# Patient Record
Sex: MALE | Race: WHITE | NOT HISPANIC OR LATINO
[De-identification: names, ages, dates, MRNs, and addresses within clinical notes are randomized per-mention and may not be internally consistent; named-entity substitution may affect disease eponyms.]

---

## 2017-01-17 ENCOUNTER — APPOINTMENT (OUTPATIENT)
Dept: INTERNAL MEDICINE | Facility: CLINIC | Age: 57
End: 2017-01-17

## 2017-04-15 ENCOUNTER — INPATIENT (INPATIENT)
Facility: HOSPITAL | Age: 57
LOS: 29 days | Discharge: HOME | End: 2017-05-15
Attending: HOSPITALIST

## 2017-04-15 DIAGNOSIS — I27.2 OTHER SECONDARY PULMONARY HYPERTENSION: ICD-10-CM

## 2017-06-27 DIAGNOSIS — N17.9 ACUTE KIDNEY FAILURE, UNSPECIFIED: ICD-10-CM

## 2017-06-27 DIAGNOSIS — L03.116 CELLULITIS OF LEFT LOWER LIMB: ICD-10-CM

## 2017-06-27 DIAGNOSIS — Z59.0 HOMELESSNESS: ICD-10-CM

## 2017-06-27 DIAGNOSIS — L97.922 NON-PRESSURE CHRONIC ULCER OF UNSPECIFIED PART OF LEFT LOWER LEG WITH FAT LAYER EXPOSED: ICD-10-CM

## 2017-06-27 DIAGNOSIS — I50.32 CHRONIC DIASTOLIC (CONGESTIVE) HEART FAILURE: ICD-10-CM

## 2017-06-27 DIAGNOSIS — N18.9 CHRONIC KIDNEY DISEASE, UNSPECIFIED: ICD-10-CM

## 2017-06-27 DIAGNOSIS — I27.2 OTHER SECONDARY PULMONARY HYPERTENSION: ICD-10-CM

## 2017-06-27 DIAGNOSIS — R06.02 SHORTNESS OF BREATH: ICD-10-CM

## 2017-06-27 DIAGNOSIS — J96.20 ACUTE AND CHRONIC RESPIRATORY FAILURE, UNSPECIFIED WHETHER WITH HYPOXIA OR HYPERCAPNIA: ICD-10-CM

## 2017-06-27 DIAGNOSIS — Z66 DO NOT RESUSCITATE: ICD-10-CM

## 2017-06-27 DIAGNOSIS — Z75.0 MEDICAL SERVICES NOT AVAILABLE IN HOME: ICD-10-CM

## 2017-06-27 DIAGNOSIS — I48.0 PAROXYSMAL ATRIAL FIBRILLATION: ICD-10-CM

## 2017-06-27 DIAGNOSIS — E66.2 MORBID (SEVERE) OBESITY WITH ALVEOLAR HYPOVENTILATION: ICD-10-CM

## 2017-06-27 DIAGNOSIS — Z91.128 PATIENT'S INTENTIONAL UNDERDOSING OF MEDICATION REGIMEN FOR OTHER REASON: ICD-10-CM

## 2017-06-27 DIAGNOSIS — E87.5 HYPERKALEMIA: ICD-10-CM

## 2017-06-27 DIAGNOSIS — K59.00 CONSTIPATION, UNSPECIFIED: ICD-10-CM

## 2017-06-27 DIAGNOSIS — I13.0 HYPERTENSIVE HEART AND CHRONIC KIDNEY DISEASE WITH HEART FAILURE AND STAGE 1 THROUGH STAGE 4 CHRONIC KIDNEY DISEASE, OR UNSPECIFIED CHRONIC KIDNEY DISEASE: ICD-10-CM

## 2017-06-27 DIAGNOSIS — T45.516A UNDERDOSING OF ANTICOAGULANTS, INITIAL ENCOUNTER: ICD-10-CM

## 2017-06-27 DIAGNOSIS — I71.4 ABDOMINAL AORTIC ANEURYSM, WITHOUT RUPTURE: ICD-10-CM

## 2017-06-27 DIAGNOSIS — I87.313 CHRONIC VENOUS HYPERTENSION (IDIOPATHIC) WITH ULCER OF BILATERAL LOWER EXTREMITY: ICD-10-CM

## 2017-06-27 DIAGNOSIS — I87.2 VENOUS INSUFFICIENCY (CHRONIC) (PERIPHERAL): ICD-10-CM

## 2017-06-27 DIAGNOSIS — T50.1X5A ADVERSE EFFECT OF LOOP [HIGH-CEILING] DIURETICS, INITIAL ENCOUNTER: ICD-10-CM

## 2017-06-27 DIAGNOSIS — E78.5 HYPERLIPIDEMIA, UNSPECIFIED: ICD-10-CM

## 2017-06-27 SDOH — ECONOMIC STABILITY - HOUSING INSECURITY: HOMELESSNESS: Z59.0

## 2017-06-28 ENCOUNTER — INPATIENT (INPATIENT)
Facility: HOSPITAL | Age: 57
LOS: 10 days | Discharge: HOME | End: 2017-07-09
Attending: INTERNAL MEDICINE

## 2017-06-28 DIAGNOSIS — I10 ESSENTIAL (PRIMARY) HYPERTENSION: ICD-10-CM

## 2017-06-28 DIAGNOSIS — J44.9 CHRONIC OBSTRUCTIVE PULMONARY DISEASE, UNSPECIFIED: ICD-10-CM

## 2017-06-28 DIAGNOSIS — I73.9 PERIPHERAL VASCULAR DISEASE, UNSPECIFIED: ICD-10-CM

## 2017-06-28 DIAGNOSIS — A41.9 SEPSIS, UNSPECIFIED ORGANISM: ICD-10-CM

## 2017-06-28 DIAGNOSIS — I27.2 OTHER SECONDARY PULMONARY HYPERTENSION: ICD-10-CM

## 2017-06-28 DIAGNOSIS — J81.0 ACUTE PULMONARY EDEMA: ICD-10-CM

## 2017-06-28 DIAGNOSIS — N18.3 CHRONIC KIDNEY DISEASE, STAGE 3 (MODERATE): ICD-10-CM

## 2017-06-28 DIAGNOSIS — I48.91 UNSPECIFIED ATRIAL FIBRILLATION: ICD-10-CM

## 2017-06-28 DIAGNOSIS — E87.5 HYPERKALEMIA: ICD-10-CM

## 2017-06-28 DIAGNOSIS — E66.01 MORBID (SEVERE) OBESITY DUE TO EXCESS CALORIES: ICD-10-CM

## 2017-06-28 DIAGNOSIS — L03.811 CELLULITIS OF HEAD [ANY PART, EXCEPT FACE]: ICD-10-CM

## 2017-06-28 DIAGNOSIS — L02.811 CUTANEOUS ABSCESS OF HEAD [ANY PART, EXCEPT FACE]: ICD-10-CM

## 2017-06-28 DIAGNOSIS — I50.9 HEART FAILURE, UNSPECIFIED: ICD-10-CM

## 2017-06-28 DIAGNOSIS — Z79.01 LONG TERM (CURRENT) USE OF ANTICOAGULANTS: ICD-10-CM

## 2017-06-28 DIAGNOSIS — E87.1 HYPO-OSMOLALITY AND HYPONATREMIA: ICD-10-CM

## 2017-06-28 DIAGNOSIS — I87.2 VENOUS INSUFFICIENCY (CHRONIC) (PERIPHERAL): ICD-10-CM

## 2017-06-28 DIAGNOSIS — L02.91 CUTANEOUS ABSCESS, UNSPECIFIED: ICD-10-CM

## 2017-06-28 DIAGNOSIS — N17.9 ACUTE KIDNEY FAILURE, UNSPECIFIED: ICD-10-CM

## 2017-06-28 DIAGNOSIS — I89.0 LYMPHEDEMA, NOT ELSEWHERE CLASSIFIED: ICD-10-CM

## 2017-06-28 DIAGNOSIS — H02.841 EDEMA OF RIGHT UPPER EYELID: ICD-10-CM

## 2017-06-28 DIAGNOSIS — G47.33 OBSTRUCTIVE SLEEP APNEA (ADULT) (PEDIATRIC): ICD-10-CM

## 2017-06-28 DIAGNOSIS — L03.90 CELLULITIS, UNSPECIFIED: ICD-10-CM

## 2017-06-28 DIAGNOSIS — H02.844 EDEMA OF LEFT UPPER EYELID: ICD-10-CM

## 2017-06-28 DIAGNOSIS — L03.213 PERIORBITAL CELLULITIS: ICD-10-CM

## 2017-06-28 DIAGNOSIS — I13.0 HYPERTENSIVE HEART AND CHRONIC KIDNEY DISEASE WITH HEART FAILURE AND STAGE 1 THROUGH STAGE 4 CHRONIC KIDNEY DISEASE, OR UNSPECIFIED CHRONIC KIDNEY DISEASE: ICD-10-CM

## 2017-12-11 ENCOUNTER — INPATIENT (INPATIENT)
Facility: HOSPITAL | Age: 57
LOS: 8 days | Discharge: HOME | End: 2017-12-20
Attending: INTERNAL MEDICINE

## 2017-12-11 DIAGNOSIS — G47.33 OBSTRUCTIVE SLEEP APNEA (ADULT) (PEDIATRIC): ICD-10-CM

## 2017-12-11 DIAGNOSIS — L02.91 CUTANEOUS ABSCESS, UNSPECIFIED: ICD-10-CM

## 2017-12-11 DIAGNOSIS — I50.9 HEART FAILURE, UNSPECIFIED: ICD-10-CM

## 2017-12-11 DIAGNOSIS — E66.01 MORBID (SEVERE) OBESITY DUE TO EXCESS CALORIES: ICD-10-CM

## 2017-12-11 DIAGNOSIS — J81.0 ACUTE PULMONARY EDEMA: ICD-10-CM

## 2017-12-11 DIAGNOSIS — L03.90 CELLULITIS, UNSPECIFIED: ICD-10-CM

## 2017-12-11 DIAGNOSIS — I10 ESSENTIAL (PRIMARY) HYPERTENSION: ICD-10-CM

## 2017-12-27 DIAGNOSIS — I50.33 ACUTE ON CHRONIC DIASTOLIC (CONGESTIVE) HEART FAILURE: ICD-10-CM

## 2017-12-27 DIAGNOSIS — E87.1 HYPO-OSMOLALITY AND HYPONATREMIA: ICD-10-CM

## 2017-12-27 DIAGNOSIS — L02.415 CUTANEOUS ABSCESS OF RIGHT LOWER LIMB: ICD-10-CM

## 2017-12-27 DIAGNOSIS — L03.115 CELLULITIS OF RIGHT LOWER LIMB: ICD-10-CM

## 2017-12-27 DIAGNOSIS — J44.1 CHRONIC OBSTRUCTIVE PULMONARY DISEASE WITH (ACUTE) EXACERBATION: ICD-10-CM

## 2017-12-27 DIAGNOSIS — I13.0 HYPERTENSIVE HEART AND CHRONIC KIDNEY DISEASE WITH HEART FAILURE AND STAGE 1 THROUGH STAGE 4 CHRONIC KIDNEY DISEASE, OR UNSPECIFIED CHRONIC KIDNEY DISEASE: ICD-10-CM

## 2017-12-27 DIAGNOSIS — E66.01 MORBID (SEVERE) OBESITY DUE TO EXCESS CALORIES: ICD-10-CM

## 2017-12-27 DIAGNOSIS — L03.90 CELLULITIS, UNSPECIFIED: ICD-10-CM

## 2017-12-27 DIAGNOSIS — I27.20 PULMONARY HYPERTENSION, UNSPECIFIED: ICD-10-CM

## 2017-12-27 DIAGNOSIS — N18.3 CHRONIC KIDNEY DISEASE, STAGE 3 (MODERATE): ICD-10-CM

## 2017-12-27 DIAGNOSIS — E11.51 TYPE 2 DIABETES MELLITUS WITH DIABETIC PERIPHERAL ANGIOPATHY WITHOUT GANGRENE: ICD-10-CM

## 2017-12-27 DIAGNOSIS — N17.9 ACUTE KIDNEY FAILURE, UNSPECIFIED: ICD-10-CM

## 2017-12-27 DIAGNOSIS — I50.9 HEART FAILURE, UNSPECIFIED: ICD-10-CM

## 2017-12-27 DIAGNOSIS — G47.33 OBSTRUCTIVE SLEEP APNEA (ADULT) (PEDIATRIC): ICD-10-CM

## 2017-12-27 DIAGNOSIS — E11.22 TYPE 2 DIABETES MELLITUS WITH DIABETIC CHRONIC KIDNEY DISEASE: ICD-10-CM

## 2017-12-27 DIAGNOSIS — A41.9 SEPSIS, UNSPECIFIED ORGANISM: ICD-10-CM

## 2017-12-27 DIAGNOSIS — Z66 DO NOT RESUSCITATE: ICD-10-CM

## 2017-12-27 DIAGNOSIS — Z53.29 PROCEDURE AND TREATMENT NOT CARRIED OUT BECAUSE OF PATIENT'S DECISION FOR OTHER REASONS: ICD-10-CM

## 2017-12-27 DIAGNOSIS — I83.019 VARICOSE VEINS OF RIGHT LOWER EXTREMITY WITH ULCER OF UNSPECIFIED SITE: ICD-10-CM

## 2017-12-27 DIAGNOSIS — I48.91 UNSPECIFIED ATRIAL FIBRILLATION: ICD-10-CM

## 2017-12-27 DIAGNOSIS — E87.5 HYPERKALEMIA: ICD-10-CM

## 2017-12-27 DIAGNOSIS — L97.919 NON-PRESSURE CHRONIC ULCER OF UNSPECIFIED PART OF RIGHT LOWER LEG WITH UNSPECIFIED SEVERITY: ICD-10-CM

## 2017-12-27 DIAGNOSIS — Z79.01 LONG TERM (CURRENT) USE OF ANTICOAGULANTS: ICD-10-CM

## 2018-06-19 ENCOUNTER — LABORATORY RESULT (OUTPATIENT)
Age: 58
End: 2018-06-19

## 2018-06-20 ENCOUNTER — NON-APPOINTMENT (OUTPATIENT)
Age: 58
End: 2018-06-20

## 2018-06-20 ENCOUNTER — APPOINTMENT (OUTPATIENT)
Dept: CARDIOLOGY | Facility: CLINIC | Age: 58
End: 2018-06-20

## 2018-06-20 VITALS
HEART RATE: 99 BPM | DIASTOLIC BLOOD PRESSURE: 100 MMHG | HEIGHT: 66 IN | BODY MASS INDEX: 50.62 KG/M2 | SYSTOLIC BLOOD PRESSURE: 180 MMHG | WEIGHT: 315 LBS

## 2018-06-20 DIAGNOSIS — I48.0 PAROXYSMAL ATRIAL FIBRILLATION: ICD-10-CM

## 2018-06-20 DIAGNOSIS — I10 ESSENTIAL (PRIMARY) HYPERTENSION: ICD-10-CM

## 2018-06-20 DIAGNOSIS — I87.2 VENOUS INSUFFICIENCY (CHRONIC) (PERIPHERAL): ICD-10-CM

## 2018-06-20 DIAGNOSIS — E78.2 MIXED HYPERLIPIDEMIA: ICD-10-CM

## 2018-06-20 DIAGNOSIS — I71.2 THORACIC AORTIC ANEURYSM, W/OUT RUPTURE: ICD-10-CM

## 2018-06-20 DIAGNOSIS — Z87.891 PERSONAL HISTORY OF NICOTINE DEPENDENCE: ICD-10-CM

## 2018-06-20 RX ORDER — ATORVASTATIN CALCIUM 20 MG/1
20 TABLET, FILM COATED ORAL
Qty: 90 | Refills: 3 | Status: ACTIVE | COMMUNITY

## 2018-06-20 RX ORDER — LISINOPRIL 10 MG/1
10 TABLET ORAL DAILY
Qty: 90 | Refills: 3 | Status: ACTIVE | COMMUNITY

## 2018-06-20 RX ORDER — DILTIAZEM HYDROCHLORIDE 300 MG/1
300 CAPSULE, COATED, EXTENDED RELEASE ORAL DAILY
Refills: 0 | Status: ACTIVE | COMMUNITY

## 2018-06-20 RX ORDER — FUROSEMIDE 40 MG/1
40 TABLET ORAL DAILY
Qty: 90 | Refills: 3 | Status: ACTIVE | COMMUNITY
Start: 1900-01-01 | End: 1900-01-01

## 2018-06-20 RX ORDER — ZOLPIDEM TARTRATE 10 MG/1
10 TABLET, FILM COATED ORAL
Refills: 0 | Status: ACTIVE | COMMUNITY

## 2018-06-20 RX ORDER — RIVAROXABAN 20 MG/1
20 TABLET, FILM COATED ORAL
Qty: 60 | Refills: 2 | Status: ACTIVE | COMMUNITY

## 2018-06-20 RX ORDER — METOPROLOL SUCCINATE 50 MG/1
50 TABLET, EXTENDED RELEASE ORAL DAILY
Qty: 90 | Refills: 3 | Status: ACTIVE | COMMUNITY
Start: 2018-06-20 | End: 1900-01-01

## 2018-06-25 LAB
ALBUMIN SERPL ELPH-MCNC: 4 G/DL
ALP BLD-CCNC: 82 U/L
ALT SERPL-CCNC: 13 U/L
ANION GAP SERPL CALC-SCNC: 17 MMOL/L
AST SERPL-CCNC: 16 U/L
BASOPHILS # BLD AUTO: 0.07 K/UL
BASOPHILS NFR BLD AUTO: 0.7 %
BILIRUB SERPL-MCNC: 0.4 MG/DL
BUN SERPL-MCNC: 25 MG/DL
CALCIUM SERPL-MCNC: 9.2 MG/DL
CHLORIDE SERPL-SCNC: 95 MMOL/L
CO2 SERPL-SCNC: 29 MMOL/L
CREAT SERPL-MCNC: 1.4 MG/DL
EOSINOPHIL # BLD AUTO: 0.26 K/UL
EOSINOPHIL NFR BLD AUTO: 2.7 %
GLUCOSE SERPL-MCNC: 177 MG/DL
HCT VFR BLD CALC: 38.7 %
HGB BLD-MCNC: 12.2 G/DL
IMM GRANULOCYTES NFR BLD AUTO: 0.4 %
LYMPHOCYTES # BLD AUTO: 1.51 K/UL
LYMPHOCYTES NFR BLD AUTO: 15.6 %
MAN DIFF?: NORMAL
MCHC RBC-ENTMCNC: 28.8 PG
MCHC RBC-ENTMCNC: 31.5 G/DL
MCV RBC AUTO: 91.3 FL
MONOCYTES # BLD AUTO: 0.94 K/UL
MONOCYTES NFR BLD AUTO: 9.7 %
NEUTROPHILS # BLD AUTO: 6.83 K/UL
NEUTROPHILS NFR BLD AUTO: 70.9 %
PLATELET # BLD AUTO: 213 K/UL
POTASSIUM SERPL-SCNC: 4.7 MMOL/L
PROT SERPL-MCNC: 7.9 G/DL
RBC # BLD: 4.24 M/UL
RBC # FLD: 15.9 %
SODIUM SERPL-SCNC: 141 MMOL/L
WBC # FLD AUTO: 9.65 K/UL

## 2018-07-10 ENCOUNTER — OUTPATIENT (OUTPATIENT)
Dept: OUTPATIENT SERVICES | Facility: HOSPITAL | Age: 58
LOS: 1 days | Discharge: HOME | End: 2018-07-10

## 2018-07-10 DIAGNOSIS — I71.2 THORACIC AORTIC ANEURYSM, WITHOUT RUPTURE: ICD-10-CM

## 2019-03-03 ENCOUNTER — EMERGENCY (EMERGENCY)
Facility: HOSPITAL | Age: 59
LOS: 0 days | Discharge: HOME | End: 2019-03-03
Attending: STUDENT IN AN ORGANIZED HEALTH CARE EDUCATION/TRAINING PROGRAM | Admitting: STUDENT IN AN ORGANIZED HEALTH CARE EDUCATION/TRAINING PROGRAM

## 2019-03-03 VITALS
OXYGEN SATURATION: 95 % | HEART RATE: 68 BPM | DIASTOLIC BLOOD PRESSURE: 83 MMHG | TEMPERATURE: 96 F | RESPIRATION RATE: 18 BRPM | SYSTOLIC BLOOD PRESSURE: 150 MMHG

## 2019-03-03 DIAGNOSIS — E78.00 PURE HYPERCHOLESTEROLEMIA, UNSPECIFIED: ICD-10-CM

## 2019-03-03 DIAGNOSIS — I11.0 HYPERTENSIVE HEART DISEASE WITH HEART FAILURE: ICD-10-CM

## 2019-03-03 DIAGNOSIS — R06.02 SHORTNESS OF BREATH: ICD-10-CM

## 2019-03-03 DIAGNOSIS — Z77.120 CONTACT WITH AND (SUSPECTED) EXPOSURE TO MOLD (TOXIC): ICD-10-CM

## 2019-03-03 DIAGNOSIS — J44.9 CHRONIC OBSTRUCTIVE PULMONARY DISEASE, UNSPECIFIED: ICD-10-CM

## 2019-03-03 DIAGNOSIS — I50.9 HEART FAILURE, UNSPECIFIED: ICD-10-CM

## 2019-03-03 DIAGNOSIS — I48.91 UNSPECIFIED ATRIAL FIBRILLATION: ICD-10-CM

## 2019-03-03 DIAGNOSIS — R60.0 LOCALIZED EDEMA: ICD-10-CM

## 2019-03-03 DIAGNOSIS — Z79.01 LONG TERM (CURRENT) USE OF ANTICOAGULANTS: ICD-10-CM

## 2019-03-03 LAB
ALBUMIN SERPL ELPH-MCNC: 4.1 G/DL — SIGNIFICANT CHANGE UP (ref 3.5–5.2)
ALP SERPL-CCNC: 103 U/L — SIGNIFICANT CHANGE UP (ref 30–115)
ALT FLD-CCNC: 19 U/L — SIGNIFICANT CHANGE UP (ref 0–41)
ANION GAP SERPL CALC-SCNC: 13 MMOL/L — SIGNIFICANT CHANGE UP (ref 7–14)
AST SERPL-CCNC: 24 U/L — SIGNIFICANT CHANGE UP (ref 0–41)
BASOPHILS # BLD AUTO: 0.03 K/UL — SIGNIFICANT CHANGE UP (ref 0–0.2)
BASOPHILS NFR BLD AUTO: 0.4 % — SIGNIFICANT CHANGE UP (ref 0–1)
BILIRUB SERPL-MCNC: 0.4 MG/DL — SIGNIFICANT CHANGE UP (ref 0.2–1.2)
BUN SERPL-MCNC: 22 MG/DL — HIGH (ref 10–20)
CALCIUM SERPL-MCNC: 9 MG/DL — SIGNIFICANT CHANGE UP (ref 8.5–10.1)
CHLORIDE SERPL-SCNC: 99 MMOL/L — SIGNIFICANT CHANGE UP (ref 98–110)
CO2 SERPL-SCNC: 25 MMOL/L — SIGNIFICANT CHANGE UP (ref 17–32)
CREAT SERPL-MCNC: 1.3 MG/DL — SIGNIFICANT CHANGE UP (ref 0.7–1.5)
EOSINOPHIL # BLD AUTO: 0.16 K/UL — SIGNIFICANT CHANGE UP (ref 0–0.7)
EOSINOPHIL NFR BLD AUTO: 2 % — SIGNIFICANT CHANGE UP (ref 0–8)
GLUCOSE SERPL-MCNC: 94 MG/DL — SIGNIFICANT CHANGE UP (ref 70–99)
HCT VFR BLD CALC: 38.4 % — LOW (ref 42–52)
HGB BLD-MCNC: 12.4 G/DL — LOW (ref 14–18)
IMM GRANULOCYTES NFR BLD AUTO: 0.4 % — HIGH (ref 0.1–0.3)
LACTATE SERPL-SCNC: 1.4 MMOL/L — SIGNIFICANT CHANGE UP (ref 0.5–2.2)
LYMPHOCYTES # BLD AUTO: 1.19 K/UL — LOW (ref 1.2–3.4)
LYMPHOCYTES # BLD AUTO: 14.8 % — LOW (ref 20.5–51.1)
MCHC RBC-ENTMCNC: 29.3 PG — SIGNIFICANT CHANGE UP (ref 27–31)
MCHC RBC-ENTMCNC: 32.3 G/DL — SIGNIFICANT CHANGE UP (ref 32–37)
MCV RBC AUTO: 90.8 FL — SIGNIFICANT CHANGE UP (ref 80–94)
MONOCYTES # BLD AUTO: 0.76 K/UL — HIGH (ref 0.1–0.6)
MONOCYTES NFR BLD AUTO: 9.5 % — HIGH (ref 1.7–9.3)
NEUTROPHILS # BLD AUTO: 5.87 K/UL — SIGNIFICANT CHANGE UP (ref 1.4–6.5)
NEUTROPHILS NFR BLD AUTO: 72.9 % — SIGNIFICANT CHANGE UP (ref 42.2–75.2)
NRBC # BLD: 0 /100 WBCS — SIGNIFICANT CHANGE UP (ref 0–0)
NT-PROBNP SERPL-SCNC: 94 PG/ML — SIGNIFICANT CHANGE UP (ref 0–300)
PLATELET # BLD AUTO: 208 K/UL — SIGNIFICANT CHANGE UP (ref 130–400)
POTASSIUM SERPL-MCNC: 4.3 MMOL/L — SIGNIFICANT CHANGE UP (ref 3.5–5)
POTASSIUM SERPL-SCNC: 4.3 MMOL/L — SIGNIFICANT CHANGE UP (ref 3.5–5)
PROT SERPL-MCNC: 7.1 G/DL — SIGNIFICANT CHANGE UP (ref 6–8)
RBC # BLD: 4.23 M/UL — LOW (ref 4.7–6.1)
RBC # FLD: 14.4 % — SIGNIFICANT CHANGE UP (ref 11.5–14.5)
SODIUM SERPL-SCNC: 137 MMOL/L — SIGNIFICANT CHANGE UP (ref 135–146)
TROPONIN T SERPL-MCNC: <0.01 NG/ML — SIGNIFICANT CHANGE UP
WBC # BLD: 8.04 K/UL — SIGNIFICANT CHANGE UP (ref 4.8–10.8)
WBC # FLD AUTO: 8.04 K/UL — SIGNIFICANT CHANGE UP (ref 4.8–10.8)

## 2019-03-03 RX ORDER — IPRATROPIUM/ALBUTEROL SULFATE 18-103MCG
3 AEROSOL WITH ADAPTER (GRAM) INHALATION ONCE
Qty: 0 | Refills: 0 | Status: COMPLETED | OUTPATIENT
Start: 2019-03-03 | End: 2019-03-03

## 2019-03-03 RX ADMIN — Medication 3 MILLILITER(S): at 15:12

## 2019-03-03 NOTE — ED ADULT TRIAGE NOTE - CHIEF COMPLAINT QUOTE
patient states he thinks he was exposed to black mold yesterday. states he has sob and dizziness. patient also c/o right leg pain from tripping a few days ago.

## 2019-03-03 NOTE — ED PROVIDER NOTE - CARE PROVIDER_API CALL
Bonnie Canela)  [facility]  [address] [place] [address]  Phone: [phone]  Fax: [phone]  Follow Up Time: 4-6 Days

## 2019-03-03 NOTE — ED ADULT NURSE NOTE - OBJECTIVE STATEMENT
pt c.o. right leg pain after tripping, denies fall. pt reports sob on exertion after being exposed to mold in apartment. denies chest pain

## 2019-03-03 NOTE — ED PROVIDER NOTE - NSFOLLOWUPINSTRUCTIONS_ED_ALL_ED_FT
How Your Lungs Work    AMBULATORY CARE:    Your lungs are part of the respiratory system. The respiratory system contains organs and tissues that help you breathe. When you breathe in (inhale), your lungs remove oxygen from the air. The oxygen is moved into your blood and goes into your heart. Your heart then pumps the oxygen to the rest of your body. When you breathe out (exhale), your lungs remove waste gas (carbon dioxide) from your body.Inspiration and Expiration         Parts of the lung:     Bronchial tubes branch from your windpipe into your left and right lungs. Bronchial tubes branch into smaller tubes (bronchioles) and end as alveoli.      Alveoli are small air sacs that have capillaries within their walls. Capillaries are small blood vessels where the exchange of oxygen and carbon dioxide happen.       Lobes are the sections of your lungs. Your right lung has 3 lobes and your left lung has 2 lobes. Your left lung is smaller to make room for your heart.      Pleura is the membrane that covers your lungs and keeps them from touching your chest wall.      Cilia are very small hair-like tissues that line the bronchial tubes. They wave back and forth and carry mucus up out of your lungs into your throat. Once in your throat, the mucus can be coughed out or swallowed.    Other organs and tissues used in breathing:     Air comes into your body through your nose or mouth and travels down your windpipe.      The muscles between your ribs allow your ribs to expand and contract slightly. This gives your lungs room to fill with air and deflate.       Your diaphragm is a muscle that separates your chest and abdominal cavity. Your diaphragm is just below your lungs. As your diaphragm moves down, your lungs expand.       Abdominal muscles help you breathe out when you are breathing fast. During exercise, your abdominal muscles push your diaphragm against your lungs more often. This pushes air out of your lungs faster and more often.       Neck and shoulder muscles may help expand the lungs if you have a lung condition, such as emphysema. Other muscles have to help in emphysema because the diaphragm does not work properly.     Decrease your risk for lung problems:     Do not smoke. Smoking can make your airways narrow. Narrow airways can make breathing difficult. Smoking can cause long-term swelling of your lungs and destroy lung tissue. Smoking also increases your risk for cancer. If you smoke, it is never too late to quit. Ask for information if you need help quitting. E-cigarettes or smokeless tobacco still contain nicotine. Talk to your healthcare provider before you use these products.      Avoid risks of toxins in the air. Toxins include secondhand smoke, air pollution, chemicals, and radon. Toxins can cause lung disease or make your lung disease worse. Test your house for radon. Make your house and car smoke-free areas. Do not exercise outside on bad air days.       Prevent infection. Respiratory infections and colds may become serious for a person who has a lung condition. Wash your hands often with soap and water. Avoid crowds during cold and flu seasons. Get a yearly flu vaccine. Ask your healthcare provider if you need a pneumonia vaccine.      Follow up with your healthcare provider regularly. Your healthcare provider will listen to your lungs. Regular follow-up visits can help your healthcare provider find a lung disease before it becomes serious.      Exercising to Lose Weight  ImageExercising can help you to lose weight. In order to lose weight through exercise, you need to do vigorous-intensity exercise. You can tell that you are exercising with vigorous intensity if you are breathing very hard and fast and cannot hold a conversation while exercising.    Moderate-intensity exercise helps to maintain your current weight. You can tell that you are exercising at a moderate level if you have a higher heart rate and faster breathing, but you are still able to hold a conversation.    How often should I exercise?  Choose an activity that you enjoy and set realistic goals. Your health care provider can help you to make an activity plan that works for you. Exercise regularly as directed by your health care provider. This may include:    Doing resistance training twice each week, such as:    Push-ups.  Sit-ups.  Lifting weights.  Using resistance bands.    Doing a given intensity of exercise for a given amount of time. Choose from these options:    150 minutes of moderate-intensity exercise every week.  75 minutes of vigorous-intensity exercise every week.  A mix of moderate-intensity and vigorous-intensity exercise every week.      Children, pregnant women, people who are out of shape, people who are overweight, and older adults may need to consult a health care provider for individual recommendations. If you have any sort of medical condition, be sure to consult your health care provider before starting a new exercise program.    What are some activities that can help me to lose weight?  Walking at a rate of at least 4.5 miles an hour.  Jogging or running at a rate of 5 miles per hour.  Biking at a rate of at least 10 miles per hour.  Lap swimming.  Roller-skating or in-line skating.  Cross-country skiing.  Vigorous competitive sports, such as football, basketball, and soccer.  Jumping rope.  Aerobic dancing.  How can I be more active in my day-to-day activities?  Use the stairs instead of the elevator.  Take a walk during your lunch break.  If you drive, park your car farther away from work or school.  If you take public transportation, get off one stop early and walk the rest of the way.  Make all of your phone calls while standing up and walking around.  Get up, stretch, and walk around every 30 minutes throughout the day.  What guidelines should I follow while exercising?  Do not exercise so much that you hurt yourself, feel dizzy, or get very short of breath.  Consult your health care provider prior to starting a new exercise program.  Wear comfortable clothes and shoes with good support.  Drink plenty of water while you exercise to prevent dehydration or heat stroke. Body water is lost during exercise and must be replaced.  Work out until you breathe faster and your heart beats faster.  This information is not intended to replace advice given to you by your health care provider. Make sure you discuss any questions you have with your health care provider.

## 2019-03-03 NOTE — ED PROVIDER NOTE - CLINICAL SUMMARY MEDICAL DECISION MAKING FREE TEXT BOX
Pt p/w improving SOB and leg pain after fall. w/up neg for acute serious problem requiring further emergent intervention. abx sent to pt's pharmacy w/ wait and see strategy for possible le cellulits (As per pt request.).  Pt stable for dc w/ PMD f/up, and care as discussed.  Pt/ family understands plan and signs and symptoms for ED return.

## 2019-03-03 NOTE — ED PROVIDER NOTE - PHYSICAL EXAMINATION
CONSTITUTIONAL: NAD  SKIN: Warm dry  HEAD: NCAT  EYES: NL inspection  ENT: MMM  NECK: Supple; non tender.  CARD: RRR  RESP: CTAB  ABD: S/NT no R/G  EXT: + b/l pedal edema at baseline, R > L at baseline. + thickened, shiny skin b/l distal tib; + ttp R ant distal tib; pulses 2+ b/l  NEURO: Grossly unremarkable  PSYCH: Cooperative, appropriate.

## 2019-03-03 NOTE — ED PROVIDER NOTE - OBJECTIVE STATEMENT
60 y/o M pmh afib on eliquis, CHF, COPD on home O2 prn, ALEXANDRA, HTN Hlipid, b/l lymphedema, p/w concern for SOB. Pt had moved in [facility] last week, had spent 5 full days in house, became progressively short of breath, felt some airway irritation, then note black mold in throughout house.  Pt has been away from house now for several days and sx have improved.  No fever, cp, new leg swelling.  Pt also notes pain to R anterior distal tib constant x3wks.  No erythema, sara calf or polpiteal pain or hx vte.  NO sig travel or immobilization. 60 y/o M pmh afib on eliquis, CHF, COPD on home O2 prn, ALEXANDRA, HTN Hlipid, b/l lymphedema, p/w concern for SOB. Pt had moved in [facility] last week, had spent 5 full days in house, became progressively short of breath, felt some airway irritation, then note black mold in throughout house.  Pt has been away from house now for several days and sx have improved.  No fever, cp, new leg swelling.  Pt also notes pain to R anterior distal tib constant x3wks s/p several falls on ice. ambulatory. no weakness or numbness.  No erythema, sara calf or polpiteal pain or hx vte.  NO sig travel or immobilization.

## 2019-03-03 NOTE — ED PROVIDER NOTE - ATTENDING CONTRIBUTION TO CARE
Agree w/ above.  IMP: pulmonary irritant vs allergy vs copd? sx improving; + leg pain, does not look like DVT, nor cellulitis.   P: labs, cxr, xray tib, neb, ekg, reassess.

## 2019-03-03 NOTE — ED PROVIDER NOTE - NSPTACCESSSVCSAPPTDETAILS_ED_ALL_ED_FT
Please follow up with your primary care doctor within 1-2 days.  Please follow up with a bariatric surgeon.

## 2019-03-03 NOTE — ED PROVIDER NOTE - PMH
COPD (chronic obstructive pulmonary disease)    High cholesterol    HTN (hypertension)    Thoracic aortic aneurysm without rupture

## 2019-03-03 NOTE — ED ADULT NURSE NOTE - PMH
COPD (chronic obstructive pulmonary disease) COPD (chronic obstructive pulmonary disease)    High cholesterol    HTN (hypertension)    Thoracic aortic aneurysm without rupture

## 2019-04-02 ENCOUNTER — INPATIENT (INPATIENT)
Facility: HOSPITAL | Age: 59
LOS: 3 days | Discharge: HOME | End: 2019-04-06
Attending: INTERNAL MEDICINE | Admitting: INTERNAL MEDICINE
Payer: MEDICAID

## 2019-04-02 VITALS
RESPIRATION RATE: 18 BRPM | DIASTOLIC BLOOD PRESSURE: 77 MMHG | TEMPERATURE: 98 F | OXYGEN SATURATION: 98 % | HEART RATE: 77 BPM | SYSTOLIC BLOOD PRESSURE: 161 MMHG

## 2019-04-02 LAB
ALBUMIN SERPL ELPH-MCNC: 4.2 G/DL — SIGNIFICANT CHANGE UP (ref 3.5–5.2)
ALP SERPL-CCNC: 92 U/L — SIGNIFICANT CHANGE UP (ref 30–115)
ALT FLD-CCNC: 19 U/L — SIGNIFICANT CHANGE UP (ref 0–41)
ANION GAP SERPL CALC-SCNC: 13 MMOL/L — SIGNIFICANT CHANGE UP (ref 7–14)
APTT BLD: 38.1 SEC — SIGNIFICANT CHANGE UP (ref 27–39.2)
AST SERPL-CCNC: 19 U/L — SIGNIFICANT CHANGE UP (ref 0–41)
BASE EXCESS BLDV CALC-SCNC: 4.6 MMOL/L — HIGH (ref -2–2)
BASOPHILS # BLD AUTO: 0.04 K/UL — SIGNIFICANT CHANGE UP (ref 0–0.2)
BASOPHILS NFR BLD AUTO: 0.5 % — SIGNIFICANT CHANGE UP (ref 0–1)
BILIRUB SERPL-MCNC: 0.5 MG/DL — SIGNIFICANT CHANGE UP (ref 0.2–1.2)
BUN SERPL-MCNC: 23 MG/DL — HIGH (ref 10–20)
CA-I SERPL-SCNC: 1.18 MMOL/L — SIGNIFICANT CHANGE UP (ref 1.12–1.3)
CALCIUM SERPL-MCNC: 9.2 MG/DL — SIGNIFICANT CHANGE UP (ref 8.5–10.1)
CHLORIDE SERPL-SCNC: 100 MMOL/L — SIGNIFICANT CHANGE UP (ref 98–110)
CO2 SERPL-SCNC: 26 MMOL/L — SIGNIFICANT CHANGE UP (ref 17–32)
CREAT SERPL-MCNC: 1.1 MG/DL — SIGNIFICANT CHANGE UP (ref 0.7–1.5)
EOSINOPHIL # BLD AUTO: 0.24 K/UL — SIGNIFICANT CHANGE UP (ref 0–0.7)
EOSINOPHIL NFR BLD AUTO: 2.8 % — SIGNIFICANT CHANGE UP (ref 0–8)
GAS PNL BLDV: 138 MMOL/L — SIGNIFICANT CHANGE UP (ref 136–145)
GAS PNL BLDV: SIGNIFICANT CHANGE UP
GLUCOSE SERPL-MCNC: 90 MG/DL — SIGNIFICANT CHANGE UP (ref 70–99)
HCO3 BLDV-SCNC: 30 MMOL/L — HIGH (ref 22–29)
HCT VFR BLD CALC: 39.3 % — LOW (ref 42–52)
HCT VFR BLDA CALC: 41 % — SIGNIFICANT CHANGE UP (ref 34–44)
HGB BLD CALC-MCNC: 13.4 G/DL — LOW (ref 14–18)
HGB BLD-MCNC: 13 G/DL — LOW (ref 14–18)
IMM GRANULOCYTES NFR BLD AUTO: 0.4 % — HIGH (ref 0.1–0.3)
INR BLD: 1.49 RATIO — HIGH (ref 0.65–1.3)
LACTATE BLDV-MCNC: 0.8 MMOL/L — SIGNIFICANT CHANGE UP (ref 0.5–1.6)
LYMPHOCYTES # BLD AUTO: 1.49 K/UL — SIGNIFICANT CHANGE UP (ref 1.2–3.4)
LYMPHOCYTES # BLD AUTO: 17.5 % — LOW (ref 20.5–51.1)
MCHC RBC-ENTMCNC: 29.2 PG — SIGNIFICANT CHANGE UP (ref 27–31)
MCHC RBC-ENTMCNC: 33.1 G/DL — SIGNIFICANT CHANGE UP (ref 32–37)
MCV RBC AUTO: 88.3 FL — SIGNIFICANT CHANGE UP (ref 80–94)
MONOCYTES # BLD AUTO: 0.93 K/UL — HIGH (ref 0.1–0.6)
MONOCYTES NFR BLD AUTO: 10.9 % — HIGH (ref 1.7–9.3)
NEUTROPHILS # BLD AUTO: 5.77 K/UL — SIGNIFICANT CHANGE UP (ref 1.4–6.5)
NEUTROPHILS NFR BLD AUTO: 67.9 % — SIGNIFICANT CHANGE UP (ref 42.2–75.2)
NRBC # BLD: 0 /100 WBCS — SIGNIFICANT CHANGE UP (ref 0–0)
NT-PROBNP SERPL-SCNC: 339 PG/ML — HIGH (ref 0–300)
PCO2 BLDV: 48 MMHG — SIGNIFICANT CHANGE UP (ref 41–51)
PH BLDV: 7.41 — SIGNIFICANT CHANGE UP (ref 7.26–7.43)
PLATELET # BLD AUTO: 184 K/UL — SIGNIFICANT CHANGE UP (ref 130–400)
PO2 BLDV: 34 MMHG — SIGNIFICANT CHANGE UP (ref 20–40)
POTASSIUM BLDV-SCNC: 4.1 MMOL/L — SIGNIFICANT CHANGE UP (ref 3.3–5.6)
POTASSIUM SERPL-MCNC: 4.4 MMOL/L — SIGNIFICANT CHANGE UP (ref 3.5–5)
POTASSIUM SERPL-SCNC: 4.4 MMOL/L — SIGNIFICANT CHANGE UP (ref 3.5–5)
PROT SERPL-MCNC: 7.1 G/DL — SIGNIFICANT CHANGE UP (ref 6–8)
PROTHROM AB SERPL-ACNC: 17.1 SEC — HIGH (ref 9.95–12.87)
RBC # BLD: 4.45 M/UL — LOW (ref 4.7–6.1)
RBC # FLD: 13.9 % — SIGNIFICANT CHANGE UP (ref 11.5–14.5)
SAO2 % BLDV: 64 % — SIGNIFICANT CHANGE UP
SODIUM SERPL-SCNC: 139 MMOL/L — SIGNIFICANT CHANGE UP (ref 135–146)
TROPONIN T SERPL-MCNC: <0.01 NG/ML — SIGNIFICANT CHANGE UP
WBC # BLD: 8.5 K/UL — SIGNIFICANT CHANGE UP (ref 4.8–10.8)
WBC # FLD AUTO: 8.5 K/UL — SIGNIFICANT CHANGE UP (ref 4.8–10.8)

## 2019-04-02 PROCEDURE — 70450 CT HEAD/BRAIN W/O DYE: CPT | Mod: 26

## 2019-04-02 PROCEDURE — 93010 ELECTROCARDIOGRAM REPORT: CPT

## 2019-04-02 PROCEDURE — 71045 X-RAY EXAM CHEST 1 VIEW: CPT | Mod: 26

## 2019-04-02 PROCEDURE — 93970 EXTREMITY STUDY: CPT | Mod: 26

## 2019-04-02 PROCEDURE — 99285 EMERGENCY DEPT VISIT HI MDM: CPT

## 2019-04-02 NOTE — ED ADULT NURSE NOTE - OBJECTIVE STATEMENT
pt 58y/o male with pmh of chf, copd, htn, dm, afib presents to the ed with increase SOB for a few days. pt states its worse with activity. pt denies of chest pain, palpitations, dizziness, cough, or fever and chills.

## 2019-04-02 NOTE — ED PROVIDER NOTE - NS ED ROS FT
Review of Systems:  •	CONSTITUTIONAL - No fever, + diaphoresis, +chills, No weight change  •	SKIN - No rash  •	HEMATOLOGIC - No abnormal bleeding or bruising  •	EYES - No eye pain, No blurred vision  •	ENT - No change in hearing, No sore throat, No neck pain, No rhinorrhea, No ear pain  •	RESPIRATORY - + shortness of breath, + productive cough  •	CARDIAC -+ chest tightness, No palpitations  •	GI - No abdominal pain, No nausea, No vomiting, No diarrhea, No constipation, No bright red blood per rectum or melena. No flank pain  •                 - No dysuria, frequency, hematuria.   •	ENDO - No polydypsia, No polyuria, No heat/cold intolerance  •	MUSCULOSKELETAL - No joint paint, + swelling LE (acute on chronic), No back pain  •	NEUROLOGIC - No numbness, No focal weakness, No headache, No dizziness  All other systems negative, unless specified in HPI

## 2019-04-02 NOTE — ED ADULT NURSE NOTE - PMH
Afib    CHF (congestive heart failure)    COPD (chronic obstructive pulmonary disease)    Diabetes mellitus, type 2    High cholesterol    HTN (hypertension)    Obesity    Sleep apnea    Thoracic aortic aneurysm without rupture

## 2019-04-02 NOTE — ED PROVIDER NOTE - OBJECTIVE STATEMENT
59 y o M w pmh of Afib on Xarelto, CHF, COPD (not currently on O2), ALEXANDRA, HTN, HLD, b/l Lymphedema, DM, presents w/ one week history of worsening shortness of breath, dizziness, sweats, chills, productive cough, and chest pain, described as a tightness, constant, and non radiating, in context of 1 month of broken CPAP (which insurance told patient would take 4-6 more weeks to send out).  Patient reports dizziness yesterday AM was severe enough where he lost his balance while walking, with consequent head trauma.  Also reports worsening b/l leg swelling over the last few days w/ R > L.  Denies fever, abdominal pain, n/v/d/c, confusion, hemoptysis, recent surgery. 59 y o M w pmh of Afib on Xarelto, CHF, COPD (not currently on O2), ALEXANDRA, HTN, HLD, b/l Lymphedema, DM, presents w/ one week history of worsening shortness of breath, dizziness, sweats, chills, productive cough, and chest pain, described as a tightness, constant, and non radiating, in context of 1 month of broken CPAP (which insurance told patient would take 4-6 more weeks to send out).  Patient reports dizziness yesterday AM was severe enough where he lost his balance while walking, with consequent fall & head trauma.  Also reports worsening b/l leg swelling over the last few days w/ R > L.  Denies fever, abdominal pain, n/v/d/c, confusion, hemoptysis, recent surgery.

## 2019-04-02 NOTE — ED ADULT NURSE NOTE - NSIMPLEMENTINTERV_GEN_ALL_ED
Implemented All Universal Safety Interventions:  [place] to call system. Call bell, personal items and telephone within reach. Instruct patient to call for assistance. Room bathroom lighting operational. Non-slip footwear when patient is off stretcher. Physically safe environment: no spills, clutter or unnecessary equipment. Stretcher in lowest position, wheels locked, appropriate side rails in place.

## 2019-04-02 NOTE — ED PROVIDER NOTE - CARE PROVIDER_API CALL
Judie Esteves)  Internal Medicine  [address] [place] [address]  Phone: [phone]  Fax: [phone]  Follow Up Time:

## 2019-04-02 NOTE — ED PROVIDER NOTE - ATTENDING CONTRIBUTION TO CARE
I personally evaluated the patient. I reviewed the Resident’s or Physician Assistant’s note (as assigned above), and agree with the findings and plan except as documented in my note.  58 yo man with Afib on rivaroxaban, lymphedema, COPD/ALEXANDRA, HTN, DM presents with one week of worsening CP and SOB with exertion associated with diaphoresis and recent lack of access to his BiPAP/CPAP machine.  Patient with near syncope the day before causing him to fall and hit his head.  BMI>50.   Workup ok in Ed.  Needs admission to tele for cardiac and pulmonary workup and likely social work to assist in obtaining new CPAP machine as much of this may be related to recent lack of treatment of his ALEXANDRA.

## 2019-04-02 NOTE — ED PROVIDER NOTE - CLINICAL SUMMARY MEDICAL DECISION MAKING FREE TEXT BOX
58 yo man with multiple medical issues with recent worsening CP and SOB with exertion associated with diaphoresis.  Needs admission for ACS workup as well as CPAP machine as much of his problems are related to untreated ALEXANDRA.

## 2019-04-02 NOTE — ED PROVIDER NOTE - PHYSICAL EXAMINATION
CONSTITUTIONAL: Well-developed; well-nourished; in no acute distress.   SKIN: warm, dry  HEAD: Normocephalic; atraumatic.  EYES: no conjunctival injection. PERRL.   ENT: No nasal discharge; airway clear.  NECK: Supple; non tender.  CARD: S1, S2 normal; no murmurs, gallops, or rubs. Regular rate and rhythm.   RESP: +diminished breath sounds b/l, No wheezes, rales or rhonchi.  ABD: soft ntnd  EXT: +b/l lymphedema (R>L), Normal ROM.  No clubbing, cyanosis or edema.   LYMPH: No acute cervical adenopathy.  NEURO: Alert, oriented, grossly unremarkable  PSYCH: Cooperative, appropriate.

## 2019-04-03 PROBLEM — I71.2 THORACIC AORTIC ANEURYSM, WITHOUT RUPTURE: Chronic | Status: ACTIVE | Noted: 2019-03-03

## 2019-04-03 PROBLEM — I10 ESSENTIAL (PRIMARY) HYPERTENSION: Chronic | Status: ACTIVE | Noted: 2019-03-03

## 2019-04-03 PROBLEM — J44.9 CHRONIC OBSTRUCTIVE PULMONARY DISEASE, UNSPECIFIED: Chronic | Status: ACTIVE | Noted: 2019-03-03

## 2019-04-03 PROBLEM — E78.00 PURE HYPERCHOLESTEROLEMIA, UNSPECIFIED: Chronic | Status: ACTIVE | Noted: 2019-03-03

## 2019-04-03 LAB
ANION GAP SERPL CALC-SCNC: 12 MMOL/L — SIGNIFICANT CHANGE UP (ref 7–14)
BASOPHILS # BLD AUTO: 0.04 K/UL — SIGNIFICANT CHANGE UP (ref 0–0.2)
BASOPHILS NFR BLD AUTO: 0.6 % — SIGNIFICANT CHANGE UP (ref 0–1)
BUN SERPL-MCNC: 23 MG/DL — HIGH (ref 10–20)
CALCIUM SERPL-MCNC: 9.3 MG/DL — SIGNIFICANT CHANGE UP (ref 8.5–10.1)
CHLORIDE SERPL-SCNC: 97 MMOL/L — LOW (ref 98–110)
CK MB CFR SERPL CALC: 4.5 NG/ML — SIGNIFICANT CHANGE UP (ref 0.6–6.3)
CK SERPL-CCNC: 297 U/L — HIGH (ref 0–225)
CO2 SERPL-SCNC: 29 MMOL/L — SIGNIFICANT CHANGE UP (ref 17–32)
CREAT SERPL-MCNC: 1.3 MG/DL — SIGNIFICANT CHANGE UP (ref 0.7–1.5)
EOSINOPHIL # BLD AUTO: 0.27 K/UL — SIGNIFICANT CHANGE UP (ref 0–0.7)
EOSINOPHIL NFR BLD AUTO: 4.1 % — SIGNIFICANT CHANGE UP (ref 0–8)
GLUCOSE BLDC GLUCOMTR-MCNC: 125 MG/DL — HIGH (ref 70–99)
GLUCOSE BLDC GLUCOMTR-MCNC: 144 MG/DL — HIGH (ref 70–99)
GLUCOSE BLDC GLUCOMTR-MCNC: 179 MG/DL — HIGH (ref 70–99)
GLUCOSE SERPL-MCNC: 162 MG/DL — HIGH (ref 70–99)
HCT VFR BLD CALC: 41.7 % — LOW (ref 42–52)
HGB BLD-MCNC: 13.3 G/DL — LOW (ref 14–18)
IMM GRANULOCYTES NFR BLD AUTO: 0.3 % — SIGNIFICANT CHANGE UP (ref 0.1–0.3)
LYMPHOCYTES # BLD AUTO: 1.11 K/UL — LOW (ref 1.2–3.4)
LYMPHOCYTES # BLD AUTO: 16.7 % — LOW (ref 20.5–51.1)
MAGNESIUM SERPL-MCNC: 2.1 MG/DL — SIGNIFICANT CHANGE UP (ref 1.8–2.4)
MCHC RBC-ENTMCNC: 29 PG — SIGNIFICANT CHANGE UP (ref 27–31)
MCHC RBC-ENTMCNC: 31.9 G/DL — LOW (ref 32–37)
MCV RBC AUTO: 90.8 FL — SIGNIFICANT CHANGE UP (ref 80–94)
MONOCYTES # BLD AUTO: 0.76 K/UL — HIGH (ref 0.1–0.6)
MONOCYTES NFR BLD AUTO: 11.4 % — HIGH (ref 1.7–9.3)
NEUTROPHILS # BLD AUTO: 4.45 K/UL — SIGNIFICANT CHANGE UP (ref 1.4–6.5)
NEUTROPHILS NFR BLD AUTO: 66.9 % — SIGNIFICANT CHANGE UP (ref 42.2–75.2)
NRBC # BLD: 0 /100 WBCS — SIGNIFICANT CHANGE UP (ref 0–0)
PLATELET # BLD AUTO: 178 K/UL — SIGNIFICANT CHANGE UP (ref 130–400)
POTASSIUM SERPL-MCNC: 4.9 MMOL/L — SIGNIFICANT CHANGE UP (ref 3.5–5)
POTASSIUM SERPL-SCNC: 4.9 MMOL/L — SIGNIFICANT CHANGE UP (ref 3.5–5)
RBC # BLD: 4.59 M/UL — LOW (ref 4.7–6.1)
RBC # FLD: 14.4 % — SIGNIFICANT CHANGE UP (ref 11.5–14.5)
SODIUM SERPL-SCNC: 138 MMOL/L — SIGNIFICANT CHANGE UP (ref 135–146)
TROPONIN T SERPL-MCNC: <0.01 NG/ML — SIGNIFICANT CHANGE UP
WBC # BLD: 6.65 K/UL — SIGNIFICANT CHANGE UP (ref 4.8–10.8)
WBC # FLD AUTO: 6.65 K/UL — SIGNIFICANT CHANGE UP (ref 4.8–10.8)

## 2019-04-03 RX ORDER — BUDESONIDE AND FORMOTEROL FUMARATE DIHYDRATE 160; 4.5 UG/1; UG/1
2 AEROSOL RESPIRATORY (INHALATION)
Qty: 0 | Refills: 0 | Status: DISCONTINUED | OUTPATIENT
Start: 2019-04-03 | End: 2019-04-06

## 2019-04-03 RX ORDER — ATORVASTATIN CALCIUM 80 MG/1
0 TABLET, FILM COATED ORAL
Qty: 0 | Refills: 0 | COMMUNITY

## 2019-04-03 RX ORDER — METOPROLOL TARTRATE 50 MG
0 TABLET ORAL
Qty: 0 | Refills: 0 | COMMUNITY

## 2019-04-03 RX ORDER — FUROSEMIDE 40 MG
1 TABLET ORAL
Qty: 0 | Refills: 0 | COMMUNITY

## 2019-04-03 RX ORDER — RIVAROXABAN 15 MG-20MG
20 KIT ORAL EVERY 24 HOURS
Qty: 0 | Refills: 0 | Status: DISCONTINUED | OUTPATIENT
Start: 2019-04-03 | End: 2019-04-06

## 2019-04-03 RX ORDER — FUROSEMIDE 40 MG
0 TABLET ORAL
Qty: 0 | Refills: 0 | COMMUNITY

## 2019-04-03 RX ORDER — RIVAROXABAN 15 MG-20MG
0 KIT ORAL
Qty: 0 | Refills: 0 | COMMUNITY

## 2019-04-03 RX ORDER — IPRATROPIUM/ALBUTEROL SULFATE 18-103MCG
3 AEROSOL WITH ADAPTER (GRAM) INHALATION EVERY 6 HOURS
Qty: 0 | Refills: 0 | Status: DISCONTINUED | OUTPATIENT
Start: 2019-04-03 | End: 2019-04-06

## 2019-04-03 RX ORDER — DILTIAZEM HCL 120 MG
1 CAPSULE, EXT RELEASE 24 HR ORAL
Qty: 0 | Refills: 0 | COMMUNITY

## 2019-04-03 RX ORDER — LISINOPRIL 2.5 MG/1
1 TABLET ORAL
Qty: 0 | Refills: 0 | COMMUNITY

## 2019-04-03 RX ORDER — METOPROLOL TARTRATE 50 MG
1 TABLET ORAL
Qty: 0 | Refills: 0 | COMMUNITY

## 2019-04-03 RX ORDER — LISINOPRIL 2.5 MG/1
0 TABLET ORAL
Qty: 0 | Refills: 0 | COMMUNITY

## 2019-04-03 RX ORDER — CHLORHEXIDINE GLUCONATE 213 G/1000ML
1 SOLUTION TOPICAL
Qty: 0 | Refills: 0 | Status: DISCONTINUED | OUTPATIENT
Start: 2019-04-03 | End: 2019-04-06

## 2019-04-03 RX ORDER — METOPROLOL TARTRATE 50 MG
50 TABLET ORAL DAILY
Qty: 0 | Refills: 0 | Status: DISCONTINUED | OUTPATIENT
Start: 2019-04-03 | End: 2019-04-06

## 2019-04-03 RX ORDER — METFORMIN HYDROCHLORIDE 850 MG/1
0 TABLET ORAL
Qty: 0 | Refills: 0 | COMMUNITY

## 2019-04-03 RX ORDER — RIVAROXABAN 15 MG-20MG
1 KIT ORAL
Qty: 0 | Refills: 0 | COMMUNITY

## 2019-04-03 RX ORDER — MOMETASONE FUROATE AND FORMOTEROL FUMARATE DIHYDRATE 200; 5 UG/1; UG/1
2 AEROSOL RESPIRATORY (INHALATION)
Qty: 0 | Refills: 0 | COMMUNITY

## 2019-04-03 RX ORDER — ATORVASTATIN CALCIUM 80 MG/1
1 TABLET, FILM COATED ORAL
Qty: 0 | Refills: 0 | COMMUNITY

## 2019-04-03 RX ORDER — DILTIAZEM HCL 120 MG
300 CAPSULE, EXT RELEASE 24 HR ORAL DAILY
Qty: 0 | Refills: 0 | Status: DISCONTINUED | OUTPATIENT
Start: 2019-04-03 | End: 2019-04-06

## 2019-04-03 RX ORDER — LISINOPRIL 2.5 MG/1
10 TABLET ORAL DAILY
Qty: 0 | Refills: 0 | Status: DISCONTINUED | OUTPATIENT
Start: 2019-04-03 | End: 2019-04-06

## 2019-04-03 RX ORDER — METFORMIN HYDROCHLORIDE 850 MG/1
1 TABLET ORAL
Qty: 0 | Refills: 0 | COMMUNITY

## 2019-04-03 RX ORDER — FUROSEMIDE 40 MG
40 TABLET ORAL DAILY
Qty: 0 | Refills: 0 | Status: DISCONTINUED | OUTPATIENT
Start: 2019-04-03 | End: 2019-04-06

## 2019-04-03 RX ORDER — ATORVASTATIN CALCIUM 80 MG/1
80 TABLET, FILM COATED ORAL AT BEDTIME
Qty: 0 | Refills: 0 | Status: DISCONTINUED | OUTPATIENT
Start: 2019-04-03 | End: 2019-04-06

## 2019-04-03 RX ADMIN — Medication 50 MILLIGRAM(S): at 05:43

## 2019-04-03 RX ADMIN — ATORVASTATIN CALCIUM 80 MILLIGRAM(S): 80 TABLET, FILM COATED ORAL at 22:39

## 2019-04-03 RX ADMIN — Medication 300 MILLIGRAM(S): at 05:44

## 2019-04-03 RX ADMIN — RIVAROXABAN 20 MILLIGRAM(S): KIT at 17:27

## 2019-04-03 RX ADMIN — CHLORHEXIDINE GLUCONATE 1 APPLICATION(S): 213 SOLUTION TOPICAL at 05:40

## 2019-04-03 RX ADMIN — Medication 40 MILLIGRAM(S): at 05:44

## 2019-04-03 RX ADMIN — LISINOPRIL 10 MILLIGRAM(S): 2.5 TABLET ORAL at 05:43

## 2019-04-03 NOTE — H&P ADULT - NSHPLABSRESULTS_GEN_ALL_CORE
Labs:                        13.0   8.50  )-----------( 184      ( 02 Apr 2019 19:40 )             39.3             04-02    139  |  100  |  23<H>  ----------------------------<  90  4.4   |  26  |  1.1    Ca    9.2      02 Apr 2019 19:40    TPro  7.1  /  Alb  4.2  /  TBili  0.5  /  DBili  x   /  AST  19  /  ALT  19  /  AlkPhos  92  04-02    LIVER FUNCTIONS - ( 02 Apr 2019 19:40 )  Alb: 4.2 g/dL / Pro: 7.1 g/dL / ALK PHOS: 92 U/L / ALT: 19 U/L / AST: 19 U/L / GGT: x                 PT/INR - ( 02 Apr 2019 19:40 )   PT: 17.10 sec;   INR: 1.49 ratio         PTT - ( 02 Apr 2019 19:40 )  PTT:38.1 sec  CARDIAC MARKERS ( 02 Apr 2019 19:40 )  x     / <0.01 ng/mL / x     / x     / x        Serum Pro-Brain Natriuretic Peptide (04.02.19 @ 19:40)    Serum Pro-Brain Natriuretic Peptide: 339 pg/mL  Blood Gas Venous - Lactate (04.02.19 @ 20:23)    Blood Gas Venous - Lactate: 0.8 mmoL/L  Blood Gas Profile - Venous (04.02.19 @ 20:23)    pH, Venous: 7.41    pCO2, Venous: 48 mmHg    pO2, Venous: 34 mmHg    HCO3, Venous: 30 mmoL/L    Base Excess, Venous: 4.6 mmoL/L    Oxygen Saturation, Venous: 64 %      Imaging:    < from: CT Head No Cont (04.02.19 @ 21:28) >    IMPRESSION:    No CT evidence for acute intracranial pathology.      < end of copied text >    venous DUPLEX lower extremities: pending read    CXR: no acute infitrates (no official read)    ECG:  < from: 12 Lead ECG (04.02.19 @ 20:16) >    Ventricular Rate 71 BPM    Atrial Rate 71 BPM    P-R Interval 182 ms    QRS Duration 90 ms    Q-T Interval 420 ms    QTC Calculation(Bezet) 456 ms    P Axis 60 degrees    R Axis -23 degrees    T Axis 95 degrees    Diagnosis Line Normal sinus rhythm  Possible Anterior infarct , age undetermined  Abnormal ECG    Confirmed by Roger Walden ([id number]) on 4/2/2019 9:36:27 PM    < end of copied text >

## 2019-04-03 NOTE — H&P ADULT - HISTORY OF PRESENT ILLNESS
59 y o M, morbidly obese w pmh of Afib on Xarelto, CHF, COPD (not currently on O2), ALEXANDRA, HTN, HLD, b/l Lymphedema, DM, presents w/ 3 week history of worsening shortness of breath and dizziness.  History goes back to about 3 months ptp when his CPAP machine was broken. Since that time he has had progressive SOB and difficulty sleeping with night sweats. He has also been complaining of dry cough and chest tightness for the past few weeks but no sara chest pain. Moreover he has been feeling foggy and dizzy to a point where he was so dizzy yesterday that he fell and hit his head but did not lose consciousness  Denies fever, abdominal pain, n/v/d/c, confusion, hemoptysis, recent surgery.

## 2019-04-03 NOTE — H&P ADULT - ASSESSMENT
59 y o M, morbidly obese w pmh of Afib on Xarelto, CHF, COPD (not currently on O2), ALEXANDRA, HTN, HLD, b/l Lymphedema, DM, presents w/ 3 week history of worsening shortness of breath and dizziness.  History goes back to about 3 months ptp when his CPAP machine was broken. Since that time he has had progressive SOB and difficulty sleeping with night sweats.     #SOB  -likely chronic secondary to COPD/ ALEXANDRA (morbid obesity) in the setting of broken BIPAP/ CPAP  -no evidence of COPD exacerbation/ no evidence of CHF exacerbation  -no wheezing or crackles on exam  -CXR no acute infiltrates  -proBNP 339  -f/u lower extremity venous DUPLEX read  -cont BIPAP at night and PRN  -symbicort  -nebs as needed    #chest tightness since several weeks  likely related to his SOB   doubt any ACS  EKG no ischemic changes  1 st set trop neg, will repeat    #hx of CHF  unknown EF  no evidence of CHF exacerbation  cont home dose of lasix  check 2d echo  daily weight    #hx of afib  currently in NSR  cont cardizem and metoprolol   cont xarelto    #DMII  hold metformin in the hospital  start basal/ bolus insulin if FS persistently >180    #HTN  monitor BP  cont lisinorpril, cardizem and metoprolol    #DLD  cont lipitor 80mg    #DVT ppx  xarelto    #GI ppx  not indicated    #from home   consult for setup of home BIPAP

## 2019-04-03 NOTE — H&P ADULT - NSHPPHYSICALEXAM_GEN_ALL_CORE
Vital Signs Last 24 Hrs  T(C): 36.6 (02 Apr 2019 18:14), Max: 36.6 (02 Apr 2019 18:14)  T(F): 97.8 (02 Apr 2019 18:14), Max: 97.8 (02 Apr 2019 18:14)  HR: 77 (02 Apr 2019 18:14) (77 - 77)  BP: 161/77 (02 Apr 2019 18:14) (161/77 - 161/77)  BP(mean): --  RR: 18 (02 Apr 2019 18:14) (18 - 18)  SpO2: 98% (02 Apr 2019 18:14) (98% - 98%)    CONSTITUTIONAL: obese man sitting comfortably in chair; NAD  SKIN: warm, dry  HEAD: Normocephalic; atraumatic.  EYES: no conjunctival injection. PERRL.   ENT: No nasal discharge; airway clear.  NECK: Supple; non tender.  CARD: S1, S2 normal; no murmurs, gallops, or rubs. Regular rate and rhythm.   RESP: +diminished breath sounds, No wheezes, rales or rhonchi.  ABD: soft ntnd  EXT: +b/l lymphedema (R>L), Normal ROM.  No clubbing, cyanosis or edema.   NEURO: Alert, oriented, grossly unremarkable  PSYCH: Cooperative, appropriate.

## 2019-04-03 NOTE — H&P ADULT - NSICDXFAMILYHX_GEN_ALL_CORE_FT
FAMILY HISTORY:  FH: heart disease, mother and father  FH: lymphoma, sister  FH: ovarian cancer, mother

## 2019-04-03 NOTE — H&P ADULT - NSICDXPASTMEDICALHX_GEN_ALL_CORE_FT
PAST MEDICAL HISTORY:  Afib     CHF (congestive heart failure)     COPD (chronic obstructive pulmonary disease)     Diabetes mellitus, type 2     High cholesterol     HTN (hypertension)     Obesity     Sleep apnea     Thoracic aortic aneurysm without rupture

## 2019-04-03 NOTE — H&P ADULT - NSHPREVIEWOFSYSTEMS_GEN_ALL_CORE
•	CONSTITUTIONAL - No fever, + sweats, +chills, No weight change  •	SKIN - No rash  •	HEMATOLOGIC - No abnormal bleeding or bruising  •	EYES - No eye pain, No blurred vision  •	ENT - No change in hearing, No sore throat, No neck pain, No rhinorrhea, No ear pain  •	RESPIRATORY - + shortness of breath, + dry cough  •	CARDIAC -+ chest tightness, No palpitations  •	GI - No abdominal pain, No nausea, No vomiting, No diarrhea, No constipation, No bright red blood per rectum or melena. No flank pain  •                 - No dysuria, frequency, hematuria.   •	ENDO - No polydypsia, No polyuria, No heat/cold intolerance  •	MUSCULOSKELETAL - No joint paint, + swelling LE , No back pain  •	NEUROLOGIC - No numbness, No focal weakness, No headache, No dizziness

## 2019-04-03 NOTE — H&P ADULT - ATTENDING COMMENTS
59 y o M, morbidly obese w pmh of Afib on Xarelto, CHF, COPD (not currently on O2), ALEXANDRA, HTN, HLD, b/l Lymphedema, DM, presents w/ 3 week history of worsening shortness of breath and dizziness.  History goes back to about 3 months ptp when his CPAP machine was broken. Since that time he has had progressive SOB and difficulty sleeping with night sweats. He has also been complaining of dry cough and chest tightness for the past few weeks but no sara chest pain. Moreover he has been feeling foggy and dizzy to a point where he was so dizzy yesterday that he fell and hit his head but did not lose consciousness  Denies fever, abdominal pain, n/v/d/c, confusion, hemoptysis, recent surgery.    REVIEW OF SYSTEMS: see cc/HPI  CONSTITUTIONAL: No weakness, fevers or chills  EYES/ENT: No visual changes;  No vertigo or throat pain   NECK: No pain or stiffness  RESPIRATORY: No cough, wheezing, hemoptysis; No shortness of breath  CARDIOVASCULAR: No chest pain or palpitations  GASTROINTESTINAL: No abdominal or epigastric pain. No nausea, vomiting, or hematemesis; No diarrhea or constipation. No melena or hematochezia.  GENITOURINARY: No dysuria, frequency or hematuria  NEUROLOGICAL: No numbness or weakness  SKIN: No itching, rashes, (+) chronic venous stasis changes and LE lymphedema     Physical Exam:    General: WN/WD NAD  Neurology: A&Ox3, nonfocal, follows commands  Eyes: PERRLA/ EOMI  ENT/Neck: Neck supple, trachea midline, No JVD  Respiratory: CTA B/L, No wheezing, rales, rhonchi  CV: Normal rate regular rhythm, S1S2, no murmurs, rubs or gallops  Abdominal: Soft, NT, ND +BS,   Extremities: No edema, + peripheral pulses  Skin: No Rashes, Hematoma, Ecchymosis  Incisions: n/a  Tubes: n/a      A/P  Worsening Dyspnea - pulm etiology vs. cardiovascular r/o pulm HTN vs. LV dysfunction in patient w/ h/o HF ( ?? EF), ALEXANDRA, COPD  -admit to tele   -serial EKG and Leonardo   -check 2D echo, TSH and venous duplex of LEs  -IV lasix for 24-48 hrs    COPD / ALEXANDRA  -c/w outpatient Rx and PRN nebs  -BiPAP at night and PRN    A Fib.  -c/w rate control and A/C    DM type II / Dyslipidemia / Morbid obesity   - dietary eval   -f/s monitoring   -c/w current Rx 59 y o M, morbidly obese w pmh of Afib on Xarelto, CHF, COPD (not currently on O2), ALEXANDRA, HTN, HLD, b/l Lymphedema, DM, presents w/ 3 week history of worsening shortness of breath and dizziness.  History goes back to about 3 months ptp when his CPAP machine was broken. Since that time he has had progressive SOB and difficulty sleeping with night sweats. He has also been complaining of dry cough and chest tightness for the past few weeks but no sara chest pain. Moreover he has been feeling foggy and dizzy to a point where he was so dizzy yesterday that he fell and hit his head but did not lose consciousness  Denies fever, abdominal pain, n/v/d/c, confusion, hemoptysis, recent surgery.    REVIEW OF SYSTEMS: see cc/HPI  CONSTITUTIONAL: No weakness, fevers or chills  EYES/ENT: No visual changes;  No vertigo or throat pain   NECK: No pain or stiffness  RESPIRATORY: No cough, wheezing, hemoptysis; No shortness of breath  CARDIOVASCULAR: No chest pain or palpitations  GASTROINTESTINAL: No abdominal or epigastric pain. No nausea, vomiting, or hematemesis; No diarrhea or constipation. No melena or hematochezia.  GENITOURINARY: No dysuria, frequency or hematuria  NEUROLOGICAL: No numbness or weakness  SKIN: No itching, rashes, (+) chronic venous stasis changes and LE lymphedema     Physical Exam:  General: WN/WD NAD  Neurology: A&Ox3, nonfocal, follows commands  Eyes: PERRLA/ EOMI  ENT/Neck: Neck supple, trachea midline, No JVD  Respiratory: CTA B/L, No wheezing, rales, rhonchi  CV: Normal rate regular rhythm, S1S2, no murmurs, rubs or gallops  Abdominal: Soft, NT, ND +BS,   Extremities: Non-pitting edema/ lymphedema and chronic stasis changes of the bilateral LEs, + peripheral pulses  Skin: No Rashes, Hematoma, Ecchymosis, see ext exam  Incisions: n/a  Tubes: n/a      A/P  Worsening Dyspnea - pulm etiology vs. cardiovascular r/o pulm HTN vs. LV dysfunction in patient w/ h/o HF ( ?? EF), ALEXANDRA, COPD  -admit to tele   -serial EKG and Leonardo   -check 2D echo, TSH and venous duplex of LEs  -IV lasix for 24-48 hrs    COPD / ALEXANDRA  -c/w outpatient Rx and PRN nebs  -BiPAP at night and PRN    A Fib.  -c/w rate control and A/C    DM type II / Dyslipidemia / Morbid obesity   - dietary eval   -f/s monitoring   -c/w current Rx

## 2019-04-04 LAB
ALBUMIN SERPL ELPH-MCNC: 4.3 G/DL — SIGNIFICANT CHANGE UP (ref 3.5–5.2)
ALP SERPL-CCNC: 88 U/L — SIGNIFICANT CHANGE UP (ref 30–115)
ALT FLD-CCNC: 21 U/L — SIGNIFICANT CHANGE UP (ref 0–41)
ANION GAP SERPL CALC-SCNC: 13 MMOL/L — SIGNIFICANT CHANGE UP (ref 7–14)
AST SERPL-CCNC: 18 U/L — SIGNIFICANT CHANGE UP (ref 0–41)
BASOPHILS # BLD AUTO: 0.05 K/UL — SIGNIFICANT CHANGE UP (ref 0–0.2)
BASOPHILS NFR BLD AUTO: 0.7 % — SIGNIFICANT CHANGE UP (ref 0–1)
BILIRUB SERPL-MCNC: 0.4 MG/DL — SIGNIFICANT CHANGE UP (ref 0.2–1.2)
BUN SERPL-MCNC: 38 MG/DL — HIGH (ref 10–20)
CALCIUM SERPL-MCNC: 9.5 MG/DL — SIGNIFICANT CHANGE UP (ref 8.5–10.1)
CHLORIDE SERPL-SCNC: 96 MMOL/L — LOW (ref 98–110)
CO2 SERPL-SCNC: 30 MMOL/L — SIGNIFICANT CHANGE UP (ref 17–32)
CREAT SERPL-MCNC: 1.4 MG/DL — SIGNIFICANT CHANGE UP (ref 0.7–1.5)
EOSINOPHIL # BLD AUTO: 0.32 K/UL — SIGNIFICANT CHANGE UP (ref 0–0.7)
EOSINOPHIL NFR BLD AUTO: 4.2 % — SIGNIFICANT CHANGE UP (ref 0–8)
GLUCOSE BLDC GLUCOMTR-MCNC: 112 MG/DL — HIGH (ref 70–99)
GLUCOSE BLDC GLUCOMTR-MCNC: 114 MG/DL — HIGH (ref 70–99)
GLUCOSE SERPL-MCNC: 136 MG/DL — HIGH (ref 70–99)
HCT VFR BLD CALC: 41.1 % — LOW (ref 42–52)
HGB BLD-MCNC: 13.4 G/DL — LOW (ref 14–18)
IMM GRANULOCYTES NFR BLD AUTO: 0.7 % — HIGH (ref 0.1–0.3)
INR BLD: 1.24 RATIO — SIGNIFICANT CHANGE UP (ref 0.65–1.3)
LYMPHOCYTES # BLD AUTO: 1.46 K/UL — SIGNIFICANT CHANGE UP (ref 1.2–3.4)
LYMPHOCYTES # BLD AUTO: 19.1 % — LOW (ref 20.5–51.1)
MAGNESIUM SERPL-MCNC: 2 MG/DL — SIGNIFICANT CHANGE UP (ref 1.8–2.4)
MCHC RBC-ENTMCNC: 29.4 PG — SIGNIFICANT CHANGE UP (ref 27–31)
MCHC RBC-ENTMCNC: 32.6 G/DL — SIGNIFICANT CHANGE UP (ref 32–37)
MCV RBC AUTO: 90.1 FL — SIGNIFICANT CHANGE UP (ref 80–94)
MONOCYTES # BLD AUTO: 0.8 K/UL — HIGH (ref 0.1–0.6)
MONOCYTES NFR BLD AUTO: 10.4 % — HIGH (ref 1.7–9.3)
NEUTROPHILS # BLD AUTO: 4.98 K/UL — SIGNIFICANT CHANGE UP (ref 1.4–6.5)
NEUTROPHILS NFR BLD AUTO: 64.9 % — SIGNIFICANT CHANGE UP (ref 42.2–75.2)
NRBC # BLD: 0 /100 WBCS — SIGNIFICANT CHANGE UP (ref 0–0)
PLATELET # BLD AUTO: 202 K/UL — SIGNIFICANT CHANGE UP (ref 130–400)
POTASSIUM SERPL-MCNC: 5.1 MMOL/L — HIGH (ref 3.5–5)
POTASSIUM SERPL-SCNC: 5.1 MMOL/L — HIGH (ref 3.5–5)
PROT SERPL-MCNC: 7.5 G/DL — SIGNIFICANT CHANGE UP (ref 6–8)
PROTHROM AB SERPL-ACNC: 14.2 SEC — HIGH (ref 9.95–12.87)
RBC # BLD: 4.56 M/UL — LOW (ref 4.7–6.1)
RBC # FLD: 14.3 % — SIGNIFICANT CHANGE UP (ref 11.5–14.5)
SODIUM SERPL-SCNC: 139 MMOL/L — SIGNIFICANT CHANGE UP (ref 135–146)
WBC # BLD: 7.66 K/UL — SIGNIFICANT CHANGE UP (ref 4.8–10.8)
WBC # FLD AUTO: 7.66 K/UL — SIGNIFICANT CHANGE UP (ref 4.8–10.8)

## 2019-04-04 PROCEDURE — 71045 X-RAY EXAM CHEST 1 VIEW: CPT | Mod: 26

## 2019-04-04 RX ADMIN — Medication 40 MILLIGRAM(S): at 05:50

## 2019-04-04 RX ADMIN — ATORVASTATIN CALCIUM 80 MILLIGRAM(S): 80 TABLET, FILM COATED ORAL at 21:58

## 2019-04-04 RX ADMIN — Medication 50 MILLIGRAM(S): at 05:50

## 2019-04-04 RX ADMIN — CHLORHEXIDINE GLUCONATE 1 APPLICATION(S): 213 SOLUTION TOPICAL at 05:50

## 2019-04-04 RX ADMIN — RIVAROXABAN 20 MILLIGRAM(S): KIT at 17:05

## 2019-04-04 RX ADMIN — BUDESONIDE AND FORMOTEROL FUMARATE DIHYDRATE 2 PUFF(S): 160; 4.5 AEROSOL RESPIRATORY (INHALATION) at 20:49

## 2019-04-04 RX ADMIN — Medication 300 MILLIGRAM(S): at 06:35

## 2019-04-04 RX ADMIN — BUDESONIDE AND FORMOTEROL FUMARATE DIHYDRATE 2 PUFF(S): 160; 4.5 AEROSOL RESPIRATORY (INHALATION) at 10:59

## 2019-04-04 RX ADMIN — LISINOPRIL 10 MILLIGRAM(S): 2.5 TABLET ORAL at 05:50

## 2019-04-04 NOTE — PHYSICAL THERAPY INITIAL EVALUATION ADULT - CRITERIA FOR SKILLED THERAPEUTIC INTERVENTIONS
predicted duration of therapy intervention/anticipated equipment needs at discharge/therapy frequency/rehab potential/impairments found/functional limitations in following categories/risk reduction/prevention

## 2019-04-04 NOTE — PHYSICAL THERAPY INITIAL EVALUATION ADULT - IMPAIRMENTS FOUND, PT EVAL
joint integrity and mobility/gross motor/neuromotor development and sensory integration/aerobic capacity/endurance/ergonomics and body mechanics/integumentary integrity/muscle strength/poor safety awareness/fine motor/gait, locomotion, and balance

## 2019-04-04 NOTE — PHYSICAL THERAPY INITIAL EVALUATION ADULT - ADDITIONAL COMMENTS
Pt reports he uses no Assistive Device prior to admission. He used to be able to ambulate 150ft but now is limited to 15ft 2 to sleep deprivation. Pt reports his falls were in the street. Pt has 2 steps to enter / then elevator to his apt. He lives alone and has a friend living with him that he is caring for.

## 2019-04-04 NOTE — PHYSICAL THERAPY INITIAL EVALUATION ADULT - GENERAL OBSERVATIONS, REHAB EVAL
Pt seen 900-940 for a total of 40 minutes. Pt encountered sitting in chair, No apparent distress, agreeable to PT.

## 2019-04-04 NOTE — PROGRESS NOTE ADULT - SUBJECTIVE AND OBJECTIVE BOX
SUBJECTIVE:    Patient is a 59y old Male who presents with a chief complaint of shortness of breath, dizziness (03 Apr 2019 00:07)    Currently admitted to medicine with the primary diagnosis of Chest pain     Today is hospital day 2d. This morning he is resting comfortably in bed and reports no new issues or overnight events. Pt states that he tolerated the BipAP very well. Pt prefers to be on Bipap rather than CPAP. Pt states he has difficulty ambulating and has had 2 mechanical falls due to his unsteady gait because of his chronic b/l LE lymphadema.     PAST MEDICAL & SURGICAL HISTORY  Obesity  Diabetes mellitus, type 2  Afib  CHF (congestive heart failure)  Sleep apnea  HTN (hypertension)  High cholesterol  Thoracic aortic aneurysm without rupture  COPD (chronic obstructive pulmonary disease)  No significant past surgical history    SOCIAL HISTORY:  Negative for smoking/alcohol/drug use.     ALLERGIES:  clindamycin (Unknown)  contrast dye (Other)    MEDICATIONS:  STANDING MEDICATIONS  atorvastatin 80 milliGRAM(s) Oral at bedtime  buDESOnide 160 MICROgram(s)/formoterol 4.5 MICROgram(s) Inhaler 2 Puff(s) Inhalation two times a day  chlorhexidine 4% Liquid 1 Application(s) Topical <User Schedule>  diltiazem    milliGRAM(s) Oral daily  furosemide    Tablet 40 milliGRAM(s) Oral daily  lisinopril 10 milliGRAM(s) Oral daily  metoprolol succinate ER 50 milliGRAM(s) Oral daily  rivaroxaban 20 milliGRAM(s) Oral every 24 hours    PRN MEDICATIONS  ALBUTerol/ipratropium for Nebulization 3 milliLiter(s) Nebulizer every 6 hours PRN    VITALS:   T(F): 96  HR: 66  BP: 140/67  RR: 18  SpO2: 95%    CAPILLARY BLOOD GLUCOSE      LABS:                        13.3   6.65  )-----------( 178      ( 03 Apr 2019 09:25 )             41.7     04-03    138  |  97<L>  |  23<H>  ----------------------------<  162<H>  4.9   |  29  |  1.3    Ca    9.3      03 Apr 2019 09:25  Mg     2.1     04-03    TPro  7.1  /  Alb  4.2  /  TBili  0.5  /  DBili  x   /  AST  19  /  ALT  19  /  AlkPhos  92  04-02    PT/INR - ( 02 Apr 2019 19:40 )   PT: 17.10 sec;   INR: 1.49 ratio         PTT - ( 02 Apr 2019 19:40 )  PTT:38.1 sec      Creatine Kinase, Serum: 297 U/L <H> (04-03-19 @ 09:25)  Troponin T, Serum: <0.01 ng/mL (04-03-19 @ 09:25)      CARDIAC MARKERS ( 03 Apr 2019 09:25 )  x     / <0.01 ng/mL / 297 U/L / x     / 4.5 ng/mL  CARDIAC MARKERS ( 02 Apr 2019 19:40 )  x     / <0.01 ng/mL / x     / x     / x          RADIOLOGY:    PHYSICAL EXAM:  GEN: No acute distress  LUNGS: Reduced breath sounds b/l. No wheezes or crackles noted  HEART: S1/S2 present. RRR.   ABD: Soft, non-tender, non-distended. Bowel sounds present  EXT: NC/NC/NE/2+PP/ZULUAGA  NEURO: AAOX3 SUBJECTIVE:    Patient is a 59y old Male who presents with a chief complaint of shortness of breath, dizziness (03 Apr 2019 00:07)    Currently admitted to medicine with the primary diagnosis of Chest pain     Today is hospital day 2d. This morning he is resting comfortably in bed and reports no new issues or overnight events. Pt states that he tolerated the BipAP very well. Pt prefers to be on Bipap rather than CPAP. Pt states he has difficulty ambulating and has had 2 mechanical falls due to his unsteady gait because of his chronic b/l LE lymphadema.     PAST MEDICAL & SURGICAL HISTORY  Obesity  Diabetes mellitus, type 2  Afib  CHF (congestive heart failure)  Sleep apnea  HTN (hypertension)  High cholesterol  Thoracic aortic aneurysm without rupture  COPD (chronic obstructive pulmonary disease)  No significant past surgical history    SOCIAL HISTORY:  Negative for alcohol/drug use.   ex smoker    ALLERGIES:  clindamycin (Unknown)  contrast dye (Other)    MEDICATIONS:  STANDING MEDICATIONS  atorvastatin 80 milliGRAM(s) Oral at bedtime  buDESOnide 160 MICROgram(s)/formoterol 4.5 MICROgram(s) Inhaler 2 Puff(s) Inhalation two times a day  chlorhexidine 4% Liquid 1 Application(s) Topical <User Schedule>  diltiazem    milliGRAM(s) Oral daily  furosemide    Tablet 40 milliGRAM(s) Oral daily  lisinopril 10 milliGRAM(s) Oral daily  metoprolol succinate ER 50 milliGRAM(s) Oral daily  rivaroxaban 20 milliGRAM(s) Oral every 24 hours    PRN MEDICATIONS  ALBUTerol/ipratropium for Nebulization 3 milliLiter(s) Nebulizer every 6 hours PRN    VITALS:   T(F): 96  HR: 66  BP: 140/67  RR: 18  SpO2: 95% on RA    CAPILLARY BLOOD GLUCOSE      LABS:                        13.3   6.65  )-----------( 178      ( 03 Apr 2019 09:25 )             41.7     04-03    138  |  97<L>  |  23<H>  ----------------------------<  162<H>  4.9   |  29  |  1.3    Ca    9.3      03 Apr 2019 09:25  Mg     2.1     04-03    TPro  7.1  /  Alb  4.2  /  TBili  0.5  /  DBili  x   /  AST  19  /  ALT  19  /  AlkPhos  92  04-02    PT/INR - ( 02 Apr 2019 19:40 )   PT: 17.10 sec;   INR: 1.49 ratio         PTT - ( 02 Apr 2019 19:40 )  PTT:38.1 sec      Creatine Kinase, Serum: 297 U/L <H> (04-03-19 @ 09:25)  Troponin T, Serum: <0.01 ng/mL (04-03-19 @ 09:25)      CARDIAC MARKERS ( 03 Apr 2019 09:25 )  x     / <0.01 ng/mL / 297 U/L / x     / 4.5 ng/mL  CARDIAC MARKERS ( 02 Apr 2019 19:40 )  x     / <0.01 ng/mL / x     / x     / x          RADIOLOGY:    PHYSICAL EXAM:  GEN: No acute distress  LUNGS: Reduced breath sounds b/l. No wheezes or crackles noted  HEART: S1/S2 present. RRR.   ABD: Soft, non-tender, non-distended. Bowel sounds present  EXT: + LE edema b/l  NEURO: AAOX3

## 2019-04-04 NOTE — CONSULT NOTE ADULT - ASSESSMENT
IMPRESSION: Rehab of Debilitation     PRECAUTIONS: [    ] Cardiac  [x    ] Respiratory  [    ] Seizures [    ] Contact Isolation  [    ] Droplet Isolation  [    ] Other    Weight Bearing Status:     RECOMMENDATION:    Out of Bed to Chair     DVT/Decubiti Prophylaxis    REHAB PLAN:     [   x  ] Bedside P/T 3-5 times a week   [     ] Bedside O/T  2-3 times a week   [     ] No Rehab Therapy Indicated   [     ]  Speech Therapy   Conditioning/ROM                                 ADL  Bed Mobility                                            Conditioning/ROM  Transfers                                                  Bed Mobility  Sitting /Standing Balance                      Transfers                                        Gait Training                                            Sitting/Standing Balance  Stair Training [   ]Applicable                 Home equipment Eval                                                                     Splinting  [   ] Only      GOALS:   ADL   [   x ]   Independent         Transfers  [  x  ] Independent            Ambulation  [   x  ] Independent     [   x  ] With device                            [    ]  CG                                               [    ]  CG                                                    [     ] CG                            [    ] Min A                                          [    ] Min A                                                [     ] Min  A          DISCHARGE PLAN:   [     ]  Good candidate for Intensive Rehabilitation/Hospital based                                             Will tolerate 3hrs Intensive Rehab Daily                                       [ x     ]  Short Term Rehab in Skilled Nursing Facility                              vs                                     [    x  ]  Home with Outpatient or VN services                                         [      ]  Possible Candidate for Intensive Hospital based Rehab
SOB/ DIZZINESS????/ SEVERE ALEXANDRA ( RESULT REVIEWED)    -  CPAP 17 C FLEX 3  - LE DOPPLER  - PUL STANDPOINT DC PLANNING OP F/UP

## 2019-04-04 NOTE — PHYSICAL THERAPY INITIAL EVALUATION ADULT - TRANSFER SAFETY CONCERNS NOTED: SIT/STAND, REHAB EVAL
decreased safety awareness/decreased balance during turns/decreased weight-shifting ability/decreased proprioception

## 2019-04-04 NOTE — PHYSICAL THERAPY INITIAL EVALUATION ADULT - LEVEL OF INDEPENDENCE: SIT/STAND, REHAB EVAL
Hpi Title: Evaluation of Skin Lesions How Severe Are Your Spot(S)?: mild Have Your Spot(S) Been Treated In The Past?: has not been treated Location: R p shoulder Year Removed: 2017 supervision

## 2019-04-04 NOTE — PHYSICAL THERAPY INITIAL EVALUATION ADULT - PLANNED THERAPY INTERVENTIONS, PT EVAL
strengthening/transfer training/gait training/ROM/balance training/bed mobility training/Stair negotiation/stretching

## 2019-04-04 NOTE — CONSULT NOTE ADULT - SUBJECTIVE AND OBJECTIVE BOX
Patient is a 59y old  Male who presents with a chief complaint of shortness of breath, dizziness (04 Apr 2019 15:03)    HPI:  59 y o M, morbidly obese w pmh of Afib on Xarelto, CHF, COPD (not currently on O2), ALEXANDRA, HTN, HLD, b/l Lymphedema, DM, presents w/ 3 week history of worsening shortness of breath and dizziness.  History goes back to about 3 months ptp when his CPAP machine was broken. Since that time he has had progressive SOB and difficulty sleeping with night sweats. He has also been complaining of dry cough and chest tightness for the past few weeks but no sara chest pain. Moreover he has been feeling foggy and dizzy to a point where he was so dizzy yesterday that he fell and hit his head but did not lose consciousness  Denies fever, abdominal pain, n/v/d/c, confusion, hemoptysis, recent surgery. (03 Apr 2019 00:07)      PAST MEDICAL & SURGICAL HISTORY:  Obesity  Diabetes mellitus, type 2  Afib  CHF (congestive heart failure)  Sleep apnea  HTN (hypertension)  High cholesterol  Thoracic aortic aneurysm without rupture  COPD (chronic obstructive pulmonary disease)  No significant past surgical history      Hospital Course: Dyspnea on exertion possibly due to ALEXANDRA w/ non compliance to CPAP - Stable on RA at 96%  - BMP and ABG consistent w/ chronic CO2 rentention - consistent w/ ALEXANDRA  - : Pt is obese. But no clinical evidence of fluid overload - unlikely acute CHF exacerbation  - No wheezing or crackles on PE  - Venous Duplex: No DVT  - c/w BipAP at night  - Will get pulmonary eval for optimal settings for Bipap machine. despite pt being advised that he would benefit from CPAP - pt states he cannot tolerate CPAP.  - will get  for bipap at home  - c/w symbicort and nebs prn  - c/w PO Lasix     #Hx of recurrent mechanical falls due to unsteady gait because of underly  hx of CHF - unknown baseline  - no evidence of CHF exacerbation  - cont home dose of lasix  - check 2d echo  - daily weights    #hx of afib  currently in NSR  cont cardizem and metoprolol   cont xarelto    #DMII - Finger sticks wnl  hold metformin in the hospital  start basal/ bolus insulin if FS persistently >180    #HTN - Controlled  cont lisinorpril, cardizem and metoprolol    #DLD  cont lipitor 80mg    #DVT ppx  xarelto    #GI ppx  not indicated  TODAY'S SUBJECTIVE & REVIEW OF SYMPTOMS:     Constitutional WNL   Cardio WNL   Resp WNL   GI WNL  Heme WNL  Endo WNL  Skin WNL  MSK WNL  Neuro WNL  Cognitive WNL  Psych WNL      MEDICATIONS  (STANDING):  atorvastatin 80 milliGRAM(s) Oral at bedtime  buDESOnide 160 MICROgram(s)/formoterol 4.5 MICROgram(s) Inhaler 2 Puff(s) Inhalation two times a day  chlorhexidine 4% Liquid 1 Application(s) Topical <User Schedule>  diltiazem    milliGRAM(s) Oral daily  furosemide    Tablet 40 milliGRAM(s) Oral daily  lisinopril 10 milliGRAM(s) Oral daily  metoprolol succinate ER 50 milliGRAM(s) Oral daily  rivaroxaban 20 milliGRAM(s) Oral every 24 hours    MEDICATIONS  (PRN):  ALBUTerol/ipratropium for Nebulization 3 milliLiter(s) Nebulizer every 6 hours PRN Bronchospasm      FAMILY HISTORY:  FH: lymphoma: sister  FH: heart disease: mother and father  FH: ovarian cancer: mother      Allergies    clindamycin (Unknown)  contrast dye (Other)    Intolerances        SOCIAL HISTORY:    [    ] Etoh  [    ] Smoking  [    ] Substance abuse     Home Environment:  [  x  ] Home Alone  [    ] Lives with Family  [    ] Home Health Aid    Dwelling:  [x    ] Apartment  [    ] Private House  [    ] Adult Home  [    ] Skilled Nursing Facility      [    ] Short Term  [    ] Long Term  [    ] Stairs                           [    ] Elevator     FUNCTIONAL STATUS PTA: (Check all that apply)  Ambulation: [   x  ]Independent    [    ] Dependent     [    ] Non-Ambulatory  Assistive Device: [   x ] SA Cane  [    ]  Q Cane  [x    ] Walker  [    ]  Wheelchair  ADL : [    ] Independent  [    ]  Dependent       Vital Signs Last 24 Hrs  T(C): 35.7 (04 Apr 2019 15:07), Max: 36 (04 Apr 2019 00:24)  T(F): 96.3 (04 Apr 2019 15:07), Max: 96.8 (04 Apr 2019 00:24)  HR: 63 (04 Apr 2019 15:07) (63 - 85)  BP: 140/67 (04 Apr 2019 15:07) (140/67 - 176/88)  BP(mean): --  RR: 18 (04 Apr 2019 15:07) (18 - 18)  SpO2: 95% (03 Apr 2019 22:00) (92% - 95%)      PHYSICAL EXAM: Alert & Oriented X3 obese  GENERAL: NAD, well-groomed, well-developed  HEAD:  Atraumatic, Normocephalic  EYES: EOMI, PERRLA, conjunctiva and sclera clear  NECK: Supple  CHEST/LUNG: Clear bilaterally  HEART: Regular rate and rhythm  ABDOMEN: Soft, Nontender, Nondistended; Bowel sounds present  EXTREMITIES:  no calf tenderness,2+edema BLES    NERVOUS SYSTEM:  Cranial Nerves 2-12 intact [ x   ] Abnormal  [    ]  ROM: WFL all extremities [  x  ]  Abnormal [     ]  Motor Strength: WFL all extremities  [ x   ]  Abnormal [    ]  Sensation: intact to light touch [  x  ] Abnormal [    ]    FUNCTIONAL STATUS:  Bed Mobility: [   ]  Independent [ x   ]  Supervision [    ]  Needs Assistance [  ]  N/A  Transfers: [    ]  Independent [ x   ]  Supervision [    ]  Needs Assistance [    ]  N/A    Ambulation:  [    ]  Independent [  x  ]  Supervision [    ]  Needs Assistance [    ]  N/A   ADL:  [  x  ]   Independent [    ] Requires Assistance [    ] N/A   120' no device sup with me +FITCH    LABS:                        13.4   7.66  )-----------( 202      ( 04 Apr 2019 09:09 )             41.1     04-04    139  |  96<L>  |  38<H>  ----------------------------<  136<H>  5.1<H>   |  30  |  1.4    Ca    9.5      04 Apr 2019 09:09  Mg     2.0     04-04    TPro  7.5  /  Alb  4.3  /  TBili  0.4  /  DBili  x   /  AST  18  /  ALT  21  /  AlkPhos  88  04-04    PT/INR - ( 04 Apr 2019 09:09 )   PT: 14.20 sec;   INR: 1.24 ratio         PTT - ( 02 Apr 2019 19:40 )  PTT:38.1 sec      RADIOLOGY & ADDITIONAL STUDIES:
Patient is a 59y old  Male who presents with a chief complaint of shortness of breath, dizziness (04 Apr 2019 08:19)      HPI:  59 y o M, obese w pmh of Afib on Xarelto, CHF, COPD (not currently on O2), ALEXANDRA repeat sleep study few weeks ago no machine yet, old once broke few month ago, HTN, HLD, b/l Lymphedema, DM, presents w/ 3 week history of worsening shortness of breath and dizziness. History goes back to about 3 months ptp when his CPAP machine was broken. Since that time he has had progressive SOB and difficulty sleeping with night sweats. He has also been complaining of dry cough and chest tightness for the past few weeks but no sara chest pain. Moreover he has been feeling foggy and dizzy to a point where he was so dizzy yesterday that he fell and hit his head but did not lose consciousness  Denies fever, abdominal pain, n/v/d/c, confusion, hemoptysis, recent surgery. (03 Apr 2019 00:07), today feels better, occ cough      PAST MEDICAL & SURGICAL HISTORY:  Obesity  Diabetes mellitus, type 2  Afib  CHF (congestive heart failure)  Sleep apnea  HTN (hypertension)  High cholesterol  Thoracic aortic aneurysm without rupture  COPD (chronic obstructive pulmonary disease)  No significant past surgical history      SOCIAL HX:   Smoking   -    FAMILY HISTORY:  FH: lymphoma: sister  FH: heart disease: mother and father  FH: ovarian cancer: mother      REVIEW OF SYSTEMS see hpi    Allergies    clindamycin (Unknown)  contrast dye (Other)    Intolerances        ALBUTerol/ipratropium for Nebulization 3 milliLiter(s) Nebulizer every 6 hours PRN  atorvastatin 80 milliGRAM(s) Oral at bedtime  buDESOnide 160 MICROgram(s)/formoterol 4.5 MICROgram(s) Inhaler 2 Puff(s) Inhalation two times a day  chlorhexidine 4% Liquid 1 Application(s) Topical <User Schedule>  diltiazem    milliGRAM(s) Oral daily  furosemide    Tablet 40 milliGRAM(s) Oral daily  lisinopril 10 milliGRAM(s) Oral daily  metoprolol succinate ER 50 milliGRAM(s) Oral daily  rivaroxaban 20 milliGRAM(s) Oral every 24 hours  : Home Meds:      PHYSICAL EXAM    ICU Vital Signs Last 24 Hrs  T(C): 35.6 (04 Apr 2019 04:59), Max: 36 (04 Apr 2019 00:24)  T(F): 96 (04 Apr 2019 04:59), Max: 96.8 (04 Apr 2019 00:24)  HR: 66 (04 Apr 2019 05:45) (66 - 85)  BP: 140/67 (04 Apr 2019 05:45) (140/67 - 176/88)  RR: 18 (04 Apr 2019 04:59) (18 - 18)  SpO2: 95% (03 Apr 2019 22:00) (92% - 95%)      General:  HEENT:  LIS, mallampatti 4              Lymph Nodes: No cervical LN   Lungs: Bilateral BS  Cardiovascular: Regular  Abdomen: Soft, Positive BS  Extremities: No clubbing  Skin: Warm/ chronic venous stasis  Neurological: Non focal         LABS:                          13.4   7.66  )-----------( 202      ( 04 Apr 2019 09:09 )             41.1                                               04-04    139  |  96<L>  |  38<H>  ----------------------------<  136<H>  5.1<H>   |  30  |  1.4    Ca    9.5      04 Apr 2019 09:09  Mg     2.0     04-04    TPro  7.5  /  Alb  4.3  /  TBili  0.4  /  DBili  x   /  AST  18  /  ALT  21  /  AlkPhos  88  04-04      PT/INR - ( 04 Apr 2019 09:09 )   PT: 14.20 sec;   INR: 1.24 ratio         PTT - ( 02 Apr 2019 19:40 )  PTT:38.1 sec                                           CARDIAC MARKERS ( 03 Apr 2019 09:25 )  x     / <0.01 ng/mL / 297 U/L / x     / 4.5 ng/mL  CARDIAC MARKERS ( 02 Apr 2019 19:40 )  x     / <0.01 ng/mL / x     / x     / x                                                LIVER FUNCTIONS - ( 04 Apr 2019 09:09 )  Alb: 4.3 g/dL / Pro: 7.5 g/dL / ALK PHOS: 88 U/L / ALT: 21 U/L / AST: 18 U/L / GGT: x                                                                                                                                       X-Rays   reviewd    MEDICATIONS  (STANDING):  atorvastatin 80 milliGRAM(s) Oral at bedtime  buDESOnide 160 MICROgram(s)/formoterol 4.5 MICROgram(s) Inhaler 2 Puff(s) Inhalation two times a day  chlorhexidine 4% Liquid 1 Application(s) Topical <User Schedule>  diltiazem    milliGRAM(s) Oral daily  furosemide    Tablet 40 milliGRAM(s) Oral daily  lisinopril 10 milliGRAM(s) Oral daily  metoprolol succinate ER 50 milliGRAM(s) Oral daily  rivaroxaban 20 milliGRAM(s) Oral every 24 hours    MEDICATIONS  (PRN):  ALBUTerol/ipratropium for Nebulization 3 milliLiter(s) Nebulizer every 6 hours PRN Bronchospasm

## 2019-04-04 NOTE — PHYSICAL THERAPY INITIAL EVALUATION ADULT - GAIT DEVIATIONS NOTED, PT EVAL
decreased candie/increased stride width/decreased velocity of limb motion/decreased weight-shifting ability

## 2019-04-04 NOTE — PROGRESS NOTE ADULT - ATTENDING COMMENTS
Pt seen and examined independently. Feels much better on bipap at night and requests bipap at home.   Pulm consulted.   Pt ambulated with therapy today.   No acute CHF - he has chronic LE edema.  Continue current management.      I discussed the case with the resident and I reviewed his note. Agree with the history, physical exam, assessment and plan with additions as above.      PROGRESS NOTE HANDOFF    Pending: pulm consult    Disposition: home with services when bipap or cpap machine is arranged

## 2019-04-04 NOTE — PHYSICAL THERAPY INITIAL EVALUATION ADULT - PERTINENT HX OF CURRENT PROBLEM, REHAB EVAL
Pt adm for multiple falls and SOB/Dizziness. Pt reports this is due to the fact that he needed a cpap machine and didn't have one, thus was sleep deprived and dizzy when walking, then fell.

## 2019-04-05 ENCOUNTER — TRANSCRIPTION ENCOUNTER (OUTPATIENT)
Age: 59
End: 2019-04-05

## 2019-04-05 LAB — GLUCOSE BLDC GLUCOMTR-MCNC: 111 MG/DL — HIGH (ref 70–99)

## 2019-04-05 RX ADMIN — ATORVASTATIN CALCIUM 80 MILLIGRAM(S): 80 TABLET, FILM COATED ORAL at 21:06

## 2019-04-05 RX ADMIN — LISINOPRIL 10 MILLIGRAM(S): 2.5 TABLET ORAL at 06:08

## 2019-04-05 RX ADMIN — Medication 50 MILLIGRAM(S): at 06:09

## 2019-04-05 RX ADMIN — Medication 40 MILLIGRAM(S): at 06:09

## 2019-04-05 RX ADMIN — RIVAROXABAN 20 MILLIGRAM(S): KIT at 17:27

## 2019-04-05 RX ADMIN — Medication 300 MILLIGRAM(S): at 06:09

## 2019-04-05 RX ADMIN — CHLORHEXIDINE GLUCONATE 1 APPLICATION(S): 213 SOLUTION TOPICAL at 06:08

## 2019-04-05 NOTE — DISCHARGE NOTE PROVIDER - CARE PROVIDER_API CALL
Jair Day)  Critical Care Medicine; Internal Medicine; Pulmonary Disease; Sleep Medicine  [address] [place] [address]  Phone: [phone]  Fax: [phone]  Follow Up Time: Jair Day)  Critical Care Medicine; Internal Medicine; Pulmonary Disease; Sleep Medicine  [address] [place] [address]  Phone: [phone]  Fax: [phone]  Follow Up Time: 1 week    rohith paula  [address] [place] [address]  Phone: [phone]  Fax: (   )    -  Follow Up Time: 1 week

## 2019-04-05 NOTE — DISCHARGE NOTE PROVIDER - NSDCCPCAREPLAN_GEN_ALL_CORE_FT
PRINCIPAL DISCHARGE DIAGNOSIS  Diagnosis: Chest pain  Assessment and Plan of Treatment: your chest pain was attributed to your ALEXANDRA and suspicion of myocardial infarction was ruled out.      SECONDARY DISCHARGE DIAGNOSES  Diagnosis: SOB (shortness of breath)  Assessment and Plan of Treatment: your shortness of breath was due to non compliance with cpap machine. please obtain your cpap machine as instructed and use it daily. please follow up with pulmonologist in 1 week

## 2019-04-05 NOTE — PROGRESS NOTE ADULT - ASSESSMENT
59 y o M, morbidly obese w pmh of Afib on Xarelto, CHF, COPD (not currently on O2), ALEXANDRA, HTN, HLD, b/l Lymphedema, DM, presents w/ 3 week history of worsening shortness of breath and dizziness. History goes back to about 3 months ptp when his CPAP machine was broken. Since that time he has had progressive SOB and difficulty sleeping with night sweats.     #Dyspnea on exertion possibly due to ALEXANDRA w/ non compliance to CPAP - Stable on RA at 97%  - BMP and ABG consistent w/ chronic CO2 rentention - consistent w/ ALEXANDRA  - : Pt is obese. But no clinical evidence of fluid overload - unlikely acute CHF exacerbation  - No wheezing or crackles on PE  - Venous Duplex: No DVT  - c/w BipAP at night  - Pt was cleared by pulm. CPAP settings provided  - c/w symbicort and nebs prn  - c/w PO Lasix   - pt anticipated to discharge for tomorrow. Pending CPAP setup at home.  aware and setting up.    #Hx of recurrent mechanical falls due to unsteady gait because of underlying chronic b/l LE lymphadema  - no syncopal episodes or fractures  - PT/R home pt    #hx of CHF - unknown baseline  - no evidence of CHF exacerbation  - cont home dose of lasix  - check 2d echo  - daily weights    #hx of afib  currently in NSR  cont cardizem and metoprolol   cont xarelto    #DMII - Finger sticks wnl  hold metformin in the hospital  start basal/ bolus insulin if FS persistently >180    #HTN - Controlled  cont lisinorpril, cardizem and metoprolol    #DLD  cont lipitor 80mg    #DVT ppx  xarelto    #GI ppx  not indicated    #Activity: OOB to chair  #Diet; DASh  #DVT/GI PPX: Xarelto/not indicated  #Dispo: Pending CPAP setup at home.  aware and setting up.  #Code; DNR/DNi

## 2019-04-05 NOTE — PROGRESS NOTE ADULT - SUBJECTIVE AND OBJECTIVE BOX
SUBJECTIVE:    Patient is a 59y old Male who presents with a chief complaint of shortness of breath, dizziness (05 Apr 2019 14:20)    Currently admitted to medicine with the primary diagnosis of Chest pain     Today is hospital day 3d. This morning he is resting comfortably in bed and reports no new issues or overnight events. Pt is compliant w/ bipap    PAST MEDICAL & SURGICAL HISTORY  Obesity  Diabetes mellitus, type 2  Afib  CHF (congestive heart failure)  Sleep apnea  HTN (hypertension)  High cholesterol  Thoracic aortic aneurysm without rupture  COPD (chronic obstructive pulmonary disease)  No significant past surgical history    SOCIAL HISTORY:  Negative for smoking/alcohol/drug use.     ALLERGIES:  clindamycin (Unknown)  contrast dye (Other)    MEDICATIONS:  STANDING MEDICATIONS  atorvastatin 80 milliGRAM(s) Oral at bedtime  buDESOnide 160 MICROgram(s)/formoterol 4.5 MICROgram(s) Inhaler 2 Puff(s) Inhalation two times a day  chlorhexidine 4% Liquid 1 Application(s) Topical <User Schedule>  diltiazem    milliGRAM(s) Oral daily  furosemide    Tablet 40 milliGRAM(s) Oral daily  lisinopril 10 milliGRAM(s) Oral daily  metoprolol succinate ER 50 milliGRAM(s) Oral daily  rivaroxaban 20 milliGRAM(s) Oral every 24 hours    PRN MEDICATIONS  ALBUTerol/ipratropium for Nebulization 3 milliLiter(s) Nebulizer every 6 hours PRN    VITALS:   T(F): 96.3  HR: 58  BP: 154/84  RR: 18  SpO2: --    CAPILLARY BLOOD GLUCOSE      LABS:                        13.4   7.66  )-----------( 202      ( 04 Apr 2019 09:09 )             41.1     04-04    139  |  96<L>  |  38<H>  ----------------------------<  136<H>  5.1<H>   |  30  |  1.4    Ca    9.5      04 Apr 2019 09:09  Mg     2.0     04-04    TPro  7.5  /  Alb  4.3  /  TBili  0.4  /  DBili  x   /  AST  18  /  ALT  21  /  AlkPhos  88  04-04    PT/INR - ( 04 Apr 2019 09:09 )   PT: 14.20 sec;   INR: 1.24 ratio                       RADIOLOGY:    PHYSICAL EXAM:  GEN: No acute distress  LUNGS: Reduced breath sounds b/l. No wheezes or crackles noted  HEART: S1/S2 present. RRR.   ABD: Soft, non-tender, non-distended. Bowel sounds present  EXT: + LE edema b/l  NEURO: AAOX3

## 2019-04-05 NOTE — PROGRESS NOTE ADULT - ASSESSMENT
59 y o M, morbidly obese w pmh of Afib on Xarelto, CHF, COPD (not currently on O2), ALEXANDRA, HTN, HLD, b/l Lymphedema, DM, presents w/ 3 week history of worsening shortness of breath and dizziness. History goes back to about 3 months ptp when his CPAP machine was broken. Since that time he has had progressive SOB and difficulty sleeping with night sweats.     #Dyspnea on exertion possibly due to ALEXANDRA w/ non compliance to CPAP - Stable on RA at 97%  - BMP and ABG consistent w/ chronic CO2 rentention - consistent w/ ALEXANDRA  - : Pt is obese. But no clinical evidence of fluid overload - unlikely acute CHF exacerbation  - No wheezing or crackles on PE  - Venous Duplex: No DVT  - c/w BipAP at night  - Pt was cleared by pulm a  - c/w symbicort and nebs prn  - c/w PO Lasix   - pt anticiapted to discharge    #Hx of recurrent mechanical falls due to unsteady gait because of underlying chronic b/l LE lymphadema  - no syncopal episodes or fractures  - will get PT/Rehab consult    #hx of CHF - unknown baseline  - no evidence of CHF exacerbation  - cont home dose of lasix  - check 2d echo  - daily weights    #hx of afib  currently in NSR  cont cardizem and metoprolol   cont xarelto    #DMII - Finger sticks wnl  hold metformin in the hospital  start basal/ bolus insulin if FS persistently >180    #HTN - Controlled  cont lisinorpril, cardizem and metoprolol    #DLD  cont lipitor 80mg    #DVT ppx  xarelto    #GI ppx  not indicated    #Activity: OOB to chair  #Diet; DASh  #DVT/GI PPX: Xarelto/not indicated  #Dispo: Pending PT/Rehab consult  #Code; DNR/DNi 59 y o M, morbidly obese w pmh of Afib on Xarelto, CHF, COPD (not currently on O2), ALEXANDRA, HTN, HLD, b/l Lymphedema, DM, presents w/ 3 week history of worsening shortness of breath and dizziness. History goes back to about 3 months ptp when his CPAP machine was broken. Since that time he has had progressive SOB and difficulty sleeping with night sweats.     #Dyspnea on exertion possibly due to ALEXANDRA w/ non compliance to CPAP - Stable on RA at 97%  - BMP and ABG consistent w/ chronic CO2 rentention - consistent w/ ALEXANDRA  - : Pt is obese. But no clinical evidence of fluid overload - unlikely acute CHF exacerbation  - No wheezing or crackles on PE  - Venous Duplex: No DVT  - c/w BipAP at night  - Pt was cleared by pulm. CPAP settings provided  - c/w symbicort and nebs prn  - c/w PO Lasix   - pt anticipated to discharge for tomorrow. Pending CPAP setup at home.  aware and setting up.    #Hx of recurrent mechanical falls due to unsteady gait because of underlying chronic b/l LE lymphadema  - no syncopal episodes or fractures  - PT/R home pt    #hx of CHF - unknown baseline  - no evidence of CHF exacerbation  - cont home dose of lasix  - check 2d echo  - daily weights    #hx of afib  currently in NSR  cont cardizem and metoprolol   cont xarelto    #DMII - Finger sticks wnl  hold metformin in the hospital  start basal/ bolus insulin if FS persistently >180    #HTN - Controlled  cont lisinorpril, cardizem and metoprolol    #DLD  cont lipitor 80mg    #DVT ppx  xarelto    #GI ppx  not indicated    #Activity: OOB to chair  #Diet; DASh  #DVT/GI PPX: Xarelto/not indicated  #Dispo: Pending CPAP setup at home.  aware and setting up.  #Code; DNR/DNi 59 y o M, morbidly obese w pmh of Afib on Xarelto, CHF, COPD (not currently on O2), ALEXANDRA, HTN, HLD, b/l Lymphedema, DM, presents w/ 3 week history of worsening shortness of breath and dizziness. History goes back to about 3 months ptp when his CPAP machine was broken. Since that time he has had progressive SOB and difficulty sleeping with night sweats.     #Dyspnea on exertion possibly due to ALEXANDRA w/ non compliance to CPAP - Stable on RA at 97%  - BMP and ABG consistent w/ chronic CO2 rentention - consistent w/ ALEXANDRA  - : Pt is obese. But no clinical evidence of fluid overload - unlikely acute CHF exacerbation  - No wheezing or crackles on PE  - Venous Duplex: No DVT  - c/w BipAP at night  - Pt was cleared by pulm. CPAP settings provided  - c/w symbicort and nebs prn  - c/w PO Lasix   - pt anticipated to discharge for tomorrow. Pending CPAP setup at home.  aware and setting up.  - Pt has ALEXANDRA and it is medically necessary for the patient to go home with a CPAP. Pt is aware he is going home with a CPAP    #Hx of recurrent mechanical falls due to unsteady gait because of underlying chronic b/l LE lymphadema  - no syncopal episodes or fractures  - PT/R home pt    #hx of CHF - unknown baseline  - no evidence of CHF exacerbation  - cont home dose of lasix  - check 2d echo  - daily weights    #hx of afib  currently in NSR  cont cardizem and metoprolol   cont xarelto    #DMII - Finger sticks wnl  hold metformin in the hospital  start basal/ bolus insulin if FS persistently >180    #HTN - Controlled  cont lisinorpril, cardizem and metoprolol    #DLD  cont lipitor 80mg    #DVT ppx  xarelto    #GI ppx  not indicated    #Activity: OOB to chair  #Diet; DASh  #DVT/GI PPX: Xarelto/not indicated  #Dispo: Pending CPAP setup at home.  aware and setting up.  #Code; DNR/DNi

## 2019-04-05 NOTE — DISCHARGE NOTE PROVIDER - PROVIDER TOKENS
PROVIDER:[TOKEN:[[id number]]] PROVIDER:[TOKEN:[[id number]],FOLLOWUP:[1 week]],FREE:[LAST:[nisa],FIRST:[rohith],PHONE:[phone]:[(   )    -],ADDRESS:[[address] [place] [address]],FOLLOWUP:[1 week]]

## 2019-04-05 NOTE — PROGRESS NOTE ADULT - SUBJECTIVE AND OBJECTIVE BOX
SANAZ WILSON  59y  Male      Patient is a 59y old  Male who presents with a chief complaint of shortness of breath, dizziness (05 Apr 2019 14:57)      INTERVAL HPI/OVERNIGHT EVENTS:      ******************************* REVIEW OF SYSTEMS:**********************************************    All other review of systems negative    *********************** VITALS ******************************************    T(F): 96.2 (04-05-19 @ 14:00)  HR: 75 (04-05-19 @ 14:00) (58 - 75)  BP: 146/66 (04-05-19 @ 14:00) (130/61 - 154/84)  RR: 18 (04-05-19 @ 14:00) (18 - 18)  SpO2: --            ******************************** PHYSICAL EXAM:**************************************************  GENERAL: NAD    PSYCH: no agitation, baseline mentation  HEENT:     NERVOUS SYSTEM:  Alert & Oriented X3, MS  5/5 B/L  UE and LE ; Sensory intact    PULMONARY: Decreased JIMI, CTA    CARDIOVASCULAR: S1S2 RRR    GI: Soft, NT, ND; BS present.    EXTREMITIES:  LE edema B/L  LYMPH: No lymphadenopathy noted    SKIN: No rashes or lesions    ******************************************************************************************    Consultant(s) Notes Reviewed:  [x ] YES  [ ] NO    Discussed with Consultants/Other Providers [ x] YES     **************************** LABS *******************************************************                          13.4   7.66  )-----------( 202      ( 04 Apr 2019 09:09 )             41.1     04-04    139  |  96<L>  |  38<H>  ----------------------------<  136<H>  5.1<H>   |  30  |  1.4    Ca    9.5      04 Apr 2019 09:09  Mg     2.0     04-04    TPro  7.5  /  Alb  4.3  /  TBili  0.4  /  DBili  x   /  AST  18  /  ALT  21  /  AlkPhos  88  04-04        PT/INR - ( 04 Apr 2019 09:09 )   PT: 14.20 sec;   INR: 1.24 ratio           Lactate Trend        CAPILLARY BLOOD GLUCOSE      POCT Blood Glucose.: 112 mg/dL (04 Apr 2019 12:16)          **************************Active Medications *******************************************  clindamycin (Unknown)  contrast dye (Other)      ALBUTerol/ipratropium for Nebulization 3 milliLiter(s) Nebulizer every 6 hours PRN  atorvastatin 80 milliGRAM(s) Oral at bedtime  buDESOnide 160 MICROgram(s)/formoterol 4.5 MICROgram(s) Inhaler 2 Puff(s) Inhalation two times a day  chlorhexidine 4% Liquid 1 Application(s) Topical <User Schedule>  diltiazem    milliGRAM(s) Oral daily  furosemide    Tablet 40 milliGRAM(s) Oral daily  lisinopril 10 milliGRAM(s) Oral daily  metoprolol succinate ER 50 milliGRAM(s) Oral daily  rivaroxaban 20 milliGRAM(s) Oral every 24 hours      ***************************************************  RADIOLOGY & ADDITIONAL TESTS:    Imaging Personally Reviewed:  [ ] YES  [ ] NO    HEALTH ISSUES - PROBLEM Dx:

## 2019-04-05 NOTE — DISCHARGE NOTE PROVIDER - HOSPITAL COURSE
59 y o M, morbidly obese w pmh of Afib on Xarelto, CHF, COPD (not currently on O2), ALEXANDRA, HTN, HLD, b/l Lymphedema, DM, presents w/ 3 week history of worsening shortness of breath and dizziness. History goes back to about 3 months ptp when his CPAP machine was broken. Since that time he has had progressive SOB and difficulty sleeping with night sweats.         #Dyspnea on exertion possibly due to ALEXANDRA w/ non compliance to CPAP - Stable on RA at 96%    - BMP and ABG consistent w/ chronic CO2 rentention - consistent w/ ALEXANDRA    - : Pt is obese. But no clinical evidence of fluid overload - unlikely acute CHF exacerbation    - No wheezing or crackles on PE    - Venous Duplex: No DVT    - c/w BipAP at night    - Pt was evaluated by CPAP and recommended optimal settings. Pt is to obtain new CPAP machine from sleep study center today.  Pt cleared by pulm for d/c    - c/w symbicort and nebs prn    - c/w PO Lasix         #Hx of recurrent mechanical falls due to unsteady gait because of underlying chronic b/l LE lymphadema    - no syncopal episodes or fractures    - PT/R recommended pt to get visiting nurse at home to set up home pt

## 2019-04-06 ENCOUNTER — TRANSCRIPTION ENCOUNTER (OUTPATIENT)
Age: 59
End: 2019-04-06

## 2019-04-06 VITALS
TEMPERATURE: 97 F | DIASTOLIC BLOOD PRESSURE: 75 MMHG | HEART RATE: 80 BPM | RESPIRATION RATE: 17 BRPM | SYSTOLIC BLOOD PRESSURE: 142 MMHG

## 2019-04-06 LAB
GLUCOSE BLDC GLUCOMTR-MCNC: 113 MG/DL — HIGH (ref 70–99)
GLUCOSE BLDC GLUCOMTR-MCNC: 126 MG/DL — HIGH (ref 70–99)

## 2019-04-06 RX ADMIN — Medication 300 MILLIGRAM(S): at 05:40

## 2019-04-06 RX ADMIN — Medication 40 MILLIGRAM(S): at 05:41

## 2019-04-06 RX ADMIN — LISINOPRIL 10 MILLIGRAM(S): 2.5 TABLET ORAL at 05:41

## 2019-04-06 RX ADMIN — Medication 50 MILLIGRAM(S): at 05:40

## 2019-04-06 RX ADMIN — CHLORHEXIDINE GLUCONATE 1 APPLICATION(S): 213 SOLUTION TOPICAL at 05:40

## 2019-04-06 NOTE — DISCHARGE NOTE NURSING/CASE MANAGEMENT/SOCIAL WORK - NSDCDPATPORTLINK_GEN_ALL_CORE
You can access the [facility] Portal, offered by [facility], by registering with the following website: [email or web address]

## 2019-04-10 DIAGNOSIS — E78.5 HYPERLIPIDEMIA, UNSPECIFIED: ICD-10-CM

## 2019-04-10 DIAGNOSIS — Z91.19 PATIENT'S NONCOMPLIANCE WITH OTHER MEDICAL TREATMENT AND REGIMEN: ICD-10-CM

## 2019-04-10 DIAGNOSIS — Z79.01 LONG TERM (CURRENT) USE OF ANTICOAGULANTS: ICD-10-CM

## 2019-04-10 DIAGNOSIS — I48.91 UNSPECIFIED ATRIAL FIBRILLATION: ICD-10-CM

## 2019-04-10 DIAGNOSIS — G47.33 OBSTRUCTIVE SLEEP APNEA (ADULT) (PEDIATRIC): ICD-10-CM

## 2019-04-10 DIAGNOSIS — I71.2 THORACIC AORTIC ANEURYSM, WITHOUT RUPTURE: ICD-10-CM

## 2019-04-10 DIAGNOSIS — Z80.41 FAMILY HISTORY OF MALIGNANT NEOPLASM OF OVARY: ICD-10-CM

## 2019-04-10 DIAGNOSIS — Z87.891 PERSONAL HISTORY OF NICOTINE DEPENDENCE: ICD-10-CM

## 2019-04-10 DIAGNOSIS — J44.9 CHRONIC OBSTRUCTIVE PULMONARY DISEASE, UNSPECIFIED: ICD-10-CM

## 2019-04-10 DIAGNOSIS — E11.9 TYPE 2 DIABETES MELLITUS WITHOUT COMPLICATIONS: ICD-10-CM

## 2019-04-10 DIAGNOSIS — I11.0 HYPERTENSIVE HEART DISEASE WITH HEART FAILURE: ICD-10-CM

## 2019-04-10 DIAGNOSIS — R07.9 CHEST PAIN, UNSPECIFIED: ICD-10-CM

## 2019-04-10 DIAGNOSIS — I50.9 HEART FAILURE, UNSPECIFIED: ICD-10-CM

## 2019-04-10 DIAGNOSIS — R26.81 UNSTEADINESS ON FEET: ICD-10-CM

## 2019-04-10 DIAGNOSIS — Z66 DO NOT RESUSCITATE: ICD-10-CM

## 2019-04-10 DIAGNOSIS — Z80.7 FAMILY HISTORY OF OTHER MALIGNANT NEOPLASMS OF LYMPHOID, HEMATOPOIETIC AND RELATED TISSUES: ICD-10-CM

## 2019-04-10 DIAGNOSIS — Z79.84 LONG TERM (CURRENT) USE OF ORAL HYPOGLYCEMIC DRUGS: ICD-10-CM

## 2019-04-10 DIAGNOSIS — Z82.49 FAMILY HISTORY OF ISCHEMIC HEART DISEASE AND OTHER DISEASES OF THE CIRCULATORY SYSTEM: ICD-10-CM

## 2019-04-10 DIAGNOSIS — E66.01 MORBID (SEVERE) OBESITY DUE TO EXCESS CALORIES: ICD-10-CM

## 2019-06-07 PROBLEM — I48.91 UNSPECIFIED ATRIAL FIBRILLATION: Chronic | Status: ACTIVE | Noted: 2019-04-02

## 2019-06-07 PROBLEM — E66.9 OBESITY, UNSPECIFIED: Chronic | Status: ACTIVE | Noted: 2019-04-02

## 2019-06-07 PROBLEM — G47.30 SLEEP APNEA, UNSPECIFIED: Chronic | Status: ACTIVE | Noted: 2019-04-02

## 2019-06-07 PROBLEM — I50.9 HEART FAILURE, UNSPECIFIED: Chronic | Status: ACTIVE | Noted: 2019-04-02

## 2019-06-07 PROBLEM — E11.9 TYPE 2 DIABETES MELLITUS WITHOUT COMPLICATIONS: Chronic | Status: ACTIVE | Noted: 2019-04-02

## 2019-06-26 ENCOUNTER — APPOINTMENT (OUTPATIENT)
Dept: CARDIOLOGY | Facility: CLINIC | Age: 59
End: 2019-06-26

## 2019-06-27 ENCOUNTER — APPOINTMENT (OUTPATIENT)
Dept: VASCULAR SURGERY | Facility: CLINIC | Age: 59
End: 2019-06-27
Payer: MEDICAID

## 2019-06-27 DIAGNOSIS — I83.029 VARICOSE VEINS OF LEFT LOWER EXTREMITY WITH ULCER OF UNSPECIFIED SITE: ICD-10-CM

## 2019-06-27 DIAGNOSIS — L97.929 VARICOSE VEINS OF LEFT LOWER EXTREMITY WITH ULCER OF UNSPECIFIED SITE: ICD-10-CM

## 2019-06-27 PROCEDURE — 99212 OFFICE O/P EST SF 10 MIN: CPT

## 2019-06-27 RX ORDER — METFORMIN ER 500 MG 500 MG/1
500 TABLET ORAL
Qty: 60 | Refills: 0 | Status: ACTIVE | COMMUNITY
Start: 2018-12-29

## 2019-06-27 RX ORDER — ATORVASTATIN CALCIUM 80 MG/1
80 TABLET, FILM COATED ORAL
Qty: 30 | Refills: 0 | Status: ACTIVE | COMMUNITY
Start: 2019-01-09

## 2019-06-27 RX ORDER — HYDROXYZINE HYDROCHLORIDE 25 MG/1
25 TABLET ORAL
Qty: 90 | Refills: 0 | Status: ACTIVE | COMMUNITY
Start: 2019-01-09

## 2019-06-27 RX ORDER — MULTIVITAMIN WITH FOLIC ACID 400 MCG
TABLET ORAL
Qty: 30 | Refills: 0 | Status: ACTIVE | COMMUNITY
Start: 2019-01-09

## 2019-06-27 NOTE — ASSESSMENT
[FreeTextEntry1] : 58 y/o gentleman with chronic venous insufficiency and h/o ulcerations, presents for evaluation of left leg ulcer that he developed when he fell last week, states that it has been healing. The wound is clean, and no evidence of cellulitis on exam. He was advised to continue with local wound care with Silvadene, Adaptic dressing and ace bandage for compression. He will follow up as needed.

## 2019-06-27 NOTE — HISTORY OF PRESENT ILLNESS
[FreeTextEntry1] : 60 y/o gentleman with chronic venous insufficiency and h/o ulcerations, presents for evaluation of left leg ulcer that he developed when he fell last week, states that it has been healing.

## 2019-07-02 ENCOUNTER — APPOINTMENT (OUTPATIENT)
Dept: CARDIOLOGY | Facility: CLINIC | Age: 59
End: 2019-07-02

## 2019-07-02 DIAGNOSIS — R07.9 CHEST PAIN, UNSPECIFIED: ICD-10-CM

## 2020-01-01 NOTE — PHYSICAL THERAPY INITIAL EVALUATION ADULT - DISCHARGE DISPOSITION, PT EVAL
home w/ home PT/rehabilitation facility I will START or STAY ON the medications listed below when I get home from the hospital:  None

## 2020-11-06 NOTE — ED ADULT NURSE NOTE - NS ED PATIENT SAFETY CONCERN
No
You can access the FollowMyHealth Patient Portal offered by [facility] by registering at the following website: [email or web address]. By joining [facility]’s FollowMyHealth portal, you will also be able to view your health information using other applications (apps) compatible with our system.

## 2021-05-05 NOTE — ED ADULT NURSE NOTE - PAIN RATING/NUMBER SCALE (0-10): REST
Problem: Risk for Spread of Infection  Goal: Prevent transmission of infectious organism to others  Description: Prevent the transmission of infectious organisms to other patients, staff members, and visitors.   Outcome: Progressing Towards Goal 0

## 2021-10-06 PROBLEM — I10 ESSENTIAL HYPERTENSION: Status: ACTIVE | Noted: 2018-06-20
